# Patient Record
Sex: MALE | Race: WHITE | NOT HISPANIC OR LATINO | Employment: FULL TIME | URBAN - METROPOLITAN AREA
[De-identification: names, ages, dates, MRNs, and addresses within clinical notes are randomized per-mention and may not be internally consistent; named-entity substitution may affect disease eponyms.]

---

## 2017-01-16 ENCOUNTER — TELEPHONE (OUTPATIENT)
Dept: MEDICAL GROUP | Facility: CLINIC | Age: 62
End: 2017-01-16

## 2017-01-16 RX ORDER — GEMFIBROZIL 600 MG/1
TABLET, FILM COATED ORAL
Qty: 90 TAB | Refills: 3 | Status: SHIPPED | OUTPATIENT
Start: 2017-01-16 | End: 2017-06-19

## 2017-01-16 NOTE — TELEPHONE ENCOUNTER
pharmacy called and states patient is on Rouvastatin and recently received a RX for Gemifibrozil. Pharmacy states they should not be on both

## 2017-01-19 ENCOUNTER — TELEPHONE (OUTPATIENT)
Dept: MEDICAL GROUP | Facility: CLINIC | Age: 62
End: 2017-01-19

## 2017-01-19 DIAGNOSIS — R21 RASH: ICD-10-CM

## 2017-01-19 NOTE — TELEPHONE ENCOUNTER
Patient called and asked if you would be able to submit a RX for kealog creme and Prednisolone dose chuy.  He has a rash that's bothering him, He has an Appointment 2/3/17

## 2017-01-20 RX ORDER — TRIAMCINOLONE ACETONIDE 1 MG/G
1 CREAM TOPICAL 2 TIMES DAILY
Qty: 1 TUBE | Refills: 1 | Status: SHIPPED | OUTPATIENT
Start: 2017-01-20 | End: 2017-06-19

## 2017-01-20 RX ORDER — METHYLPREDNISOLONE 4 MG/1
TABLET ORAL
Qty: 21 TAB | Refills: 0 | Status: SHIPPED | OUTPATIENT
Start: 2017-01-20 | End: 2017-02-27

## 2017-02-03 ENCOUNTER — OFFICE VISIT (OUTPATIENT)
Dept: MEDICAL GROUP | Facility: MEDICAL CENTER | Age: 62
End: 2017-02-03
Payer: COMMERCIAL

## 2017-02-03 VITALS
DIASTOLIC BLOOD PRESSURE: 82 MMHG | OXYGEN SATURATION: 97 % | WEIGHT: 207 LBS | SYSTOLIC BLOOD PRESSURE: 142 MMHG | TEMPERATURE: 98 F | RESPIRATION RATE: 20 BRPM | HEART RATE: 78 BPM | HEIGHT: 72 IN | BODY MASS INDEX: 28.04 KG/M2

## 2017-02-03 DIAGNOSIS — E11.21 TYPE 2 DIABETES MELLITUS WITH DIABETIC NEPHROPATHY, WITHOUT LONG-TERM CURRENT USE OF INSULIN (HCC): ICD-10-CM

## 2017-02-03 DIAGNOSIS — R21 RASH: ICD-10-CM

## 2017-02-03 DIAGNOSIS — M06.9 RHEUMATOID ARTHRITIS INVOLVING BOTH KNEES, UNSPECIFIED RHEUMATOID FACTOR PRESENCE: ICD-10-CM

## 2017-02-03 PROCEDURE — 99215 OFFICE O/P EST HI 40 MIN: CPT | Performed by: FAMILY MEDICINE

## 2017-02-03 RX ORDER — PREDNISONE 5 MG/1
5 TABLET ORAL DAILY
Qty: 30 TAB | Refills: 0 | Status: SHIPPED | OUTPATIENT
Start: 2017-02-03 | End: 2017-06-19

## 2017-02-03 NOTE — PROGRESS NOTES
Chief Complaint   Patient presents with   • Rash     patient states he is here for a reccurring rash     Multiple medical problems    HISTORY OF PRESENT ILLNESS: Patient is a 61 y.o. male established patient who presents today for the following.      1. Type 2 diabetes mellitus with diabetic nephropathy, without long-term current use of insulin (CMS-HCC)  Reviewed labs with the patient. Diabetes is well controlled. Takes medications as prescribed. Discussed with the patient their renal complications with their diabetes.  Discussed that prolongation of this could lead to dialysis.  Discussed need to avoid nephrotoxic drugs to include NSAIDs, certain antibiotics and contrast dye. Patient understands. Patient denies any urinary problems. No blood in the urine noted.  Retinal exam up-to-date. No concerning sores on the feet. Gets regular foot exams.  Discussed medications along with risks of hypoglycemia and control of hypoglycemia.      2. Rash  This is a chronic and ongoing problem for the patient. He has had this since he was young. Medrol Dosepak got rid of the rash for him. The only change in medications has been increase in metformin. Do need to consider that as an option. Patient does not have any symptoms at present so we'll hold off and diabetes is well controlled.    3. Rheumatoid arthritis involving both knees, unspecified rheumatoid factor presence (CMS-HCC)  This is a chronic and stable problem for the patient. No recent outbreaks.      No problem-specific assessment & plan notes found for this encounter.      He  has a past medical history of Hypertension (2004); Rotator cuff disorder (1996); Hyperlipidemia (2011); Arthritis; Pain; and Diabetes.    Patient Active Problem List    Diagnosis Date Noted   • Eczema 03/07/2016   • Other male erectile dysfunction 03/07/2016   • Insomnia 03/07/2016   • Type 2 diabetes mellitus with renal manifestations (CMS-HCC) 08/07/2015   • Rash 08/07/2015   • Rheumatoid  arthritis, adult (CMS-[facility]) 08/07/2015   • Hyperlipidemia 03/22/2011   • Colon polyps 05/11/2010   • Hypertension    • Rotator cuff disorder        Allergies:Nkda and Other environmental    Current Outpatient Prescriptions   Medication Sig Dispense Refill   • predniSONE (DELTASONE) 5 MG Tab Take 1 Tab by mouth every day. 30 Tab 0   • triamcinolone acetonide (KENALOG) 0.1 % Cream Apply 1 Application to affected area(s) 2 times a day. 1 Tube 1   • gemfibrozil (LOPID) 600 MG Tab TAKE 1 TABLET BY ORAL ROUTE EVERY DAY 30 MINUTES BEFORE MORNING AND EVENING MEAL 90 Tab 3   • zolpidem (AMBIEN) 10 MG Tab Take 1 Tab by mouth at bedtime as needed for Sleep. 30 Tab 2   • amlodipine (NORVASC) 5 MG Tab TAKE 1 TABLET BY MOUTH TWICE A  Tab 0   • losartan (COZAAR) 100 MG Tab TAKE 1 TAB BY MOUTH EVERY DAY. 90 Tab 2   • losartan (COZAAR) 100 MG Tab TAKE 1 TAB BY MOUTH EVERY DAY. 90 Tab 2   • rosuvastatin (CRESTOR) 10 MG Tab TAKE 1 TABLET BY ORAL ROUTE EVERY DAY 30 Tab 0   • Blood Glucose Monitoring Suppl SUPPLIES Misc 1 Strip by Does not apply route 3 times a day. Test strips order: Test strips for One Touch Ultra 2 meter. Sig: use TID and prn ssx high or low sugar #100 RF x 3 100 Strip 3   • Lancets Misc 1 Each by Does not apply route 3 times a day as needed. Lancets order: Lancets for One Touch Ultra 2 meter. Sig: use TID and prn ssx high or low sugar. #100 RF x 3 100 Each 3   • clobetasol (TEMOVATE) 0.05 % Cream Apply 1 Application to affected area(s) 2 times a day. 1 Tube 0   • metformin ER modified (GLUMETZA) 1000 MG TABLET SR 24 HR Take 2 Tabs by mouth every day. 180 Tab 3   • tadalafil (CIALIS) 20 MG tablet Take 1 Tab by mouth as needed for Erectile Dysfunction. 6 Tab 3   • nabumetone (RELAFEN) 750 MG Tab Take 750 mg by mouth 2 times a day. Indications: Rheumatoid Arthritis     • hydroxychloroquine (PLAQUENIL) 200 MG Tab Take 200 mg by mouth every day. Indications: Rheumatoid Arthritis     • Omega-3 Fatty Acids (FISH OIL)  "1000 MG Cap capsule Take 1,000 mg by mouth 3 times a day, with meals.     • FOLIC ACID PO Take  by mouth.     • hydrocodone/acetaminophen (NORCO)  MG TABS Take 1-2 Tabs by mouth every 6 hours as needed.     • MethylPREDNISolone (MEDROL DOSEPAK) 4 MG Tablet Therapy Pack 6 tabs on day one, 5 tabs on day 2, 4 tabs on day 3, 3 tabs on day 4, 2 tabs on day 5, and 1 tab on day 6 21 Tab 0     No current facility-administered medications for this visit.       Social History   Substance Use Topics   • Smoking status: Current Every Day Smoker -- 0.50 packs/day for 30 years     Types: Cigarettes   • Smokeless tobacco: Never Used   • Alcohol Use: 2.4 oz/week     4 Standard drinks or equivalent per week      Comment: mod on occasion       Family History   Problem Relation Age of Onset   • Heart Disease Mother      MI, age 73   • Cancer Father      glioblasoma, age 82   • Stroke Mother    • Diabetes Paternal Grandfather    • Heart Attack Mother        Health Maintenance:      Review of Systems   No fever, chills, nausea, vomiting, diarrhea, chest pain or shortness of breath.  See HPI    Exam:  Blood pressure 142/82, pulse 78, temperature 36.7 °C (98 °F), resp. rate 20, height 1.829 m (6' 0.01\"), weight 93.895 kg (207 lb), SpO2 97 %. Body mass index is 28.07 kg/(m^2).  Constitutional:  NAD, well appearing.  HEENT:   NC/AT, PERRLA, EOMI, OP clear, no lymphadenopathy, no thyromegaly.    Cardiovascular: RRR.   No m/r/g. No carotid bruits.       Lungs:   CTAB, no w/r/r, no respiratory distress.  Abdomen: Soft, NT/ND + BS, no masses, no suprapubic tenderness, no hepatomegaly.  Extremities:  2+ DP and radial pulses bilaterally.  No c/c/e.  Skin:  Warm and dry.  Patient has healing areas of rash on his back and lower legs. No acute flares at present.diab  Neurologic: Alert & oriented x 3, CN II-XII grossly intact, strength and sensation grossly intact.  No focal deficits noted.  Psychiatric:  Affect normal, mood normal, judgment " normal.    Assessment/Plan:     1. Type 2 diabetes mellitus with diabetic nephropathy, without long-term current use of insulin (CMS-HCC)  Diabetes is well controlled. Discussed extensively renal manifestations and possible progression to dialysis without better control.  Continue hypoglycemic medications.  Continue ACEI/ARB  and statin.  Labs as indicated. Continue to monitor    - CBC WITHOUT DIFFERENTIAL; Future  - COMP METABOLIC PANEL; Future  - HEMOGLOBIN A1C; Future  - MICROALBUMIN CREAT RATIO URINE; Future  - LIPID PROFILE; Future    2. Rash  Patient did well with Medrol Dosepak. He is given a use prednisone intermittently as needed. Doing well at present.  - predniSONE (DELTASONE) 5 MG Tab; Take 1 Tab by mouth every day.  Dispense: 30 Tab; Refill: 0    3. Rheumatoid arthritis involving both knees, unspecified rheumatoid factor presence (CMS-HCC)  Chronic and stable problem. We'll follow along with the patient's rheumatologist. Continue DMARD.    I spent greater than 40 minutes face-to-face with the patient of which greater than 50% of the time was spent in coordination of care and counseling. This included extensive chart review to review the patient's medications and recent changes. Attempts to locate dermatological and pathology records and reviewed.          Followup: No Follow-up on file.    Please note that this dictation was created using voice recognition software. I have made every reasonable attempt to correct obvious errors, but I expect that there are errors of grammar and possibly content that I did not discover before finalizing the note.

## 2017-02-03 NOTE — Clinical Note
[facility]  Anthony Olson M.D.  [address]  [place] [address]  Fax: [phone] Authorization for Release/Disclosure of Protected Health Information   Name: ARIES COHEN : 1955 SSN: [id number]   Address: [address] [place] [address]  [place] [address] Phone:    [phone] (home)    I authorize the entity listed below to release/disclose the PHI below to [facility]/Anthony Olson M.D.   Provider or Entity Name:  [facility]     Address   City, State, [facility]   Phone:      Fax:     Reason for request: continuity of care   Information to be released:    [  ] LAST COLONOSCOPY, including any PATH REPORT [  ] LAST DEXA  [  ] LAST MAMMOGRAM  [  ] LAST PAP [ x ] RETINA EXAM REPORT  [  ] IMMUNIZATION RECORDS  [  ] Release all info      [  ] Check here and initial the line next to each item to release ALL health information INCLUDING  _____ Care and treatment for drug and / or alcohol abuse  _____ HIV testing, infection status, or AIDS  _____ Genetic Testing    DATES OF SERVICE OR TIME PERIOD TO BE DISCLOSED: ___most recent__________  I understand and acknowledge that:  * This Authorization may be revoked at any time by you in writing, except if your health information has already been used or disclosed.  * Your health information that will be used or disclosed as a result of you signing this authorization could be re-disclosed by the recipient. If this occurs, your re-disclosed health information may no longer be protected by State or Federal laws.  * You may refuse to sign this Authorization. Your refusal will not affect your ability to obtain treatment.  * This Authorization becomes effective upon signing and will  on (date) __________. If no date is indicated, this Authorization will  one (1) year from the signature date.    Name: Aries Cohen    Signature:     Date: 2/3/2017

## 2017-02-03 NOTE — MR AVS SNAPSHOT
"        Aries Cohen   2/3/2017 8:00 AM   Office Visit   MRN: [id number]    Department:  [facility]   Dept Phone:  [phone]    Description:  Male : 1955   Provider:  Anthony Olson M.D.           Reason for Visit     Rash patient states he is here for a reccurring rash      Allergies as of 2/3/2017     Allergen Noted Reactions    Nkda [No Known Drug Allergy] 2011       Other Environmental 2011         You were diagnosed with     Type 2 diabetes mellitus with diabetic nephropathy, without long-term current use of insulin (CMS-HCC)   [8044077]       Rash   [226221]         Vital Signs     Blood Pressure Pulse Temperature Respirations Height Weight    142/82 mmHg 78 36.7 °C (98 °F) 20 1.829 m (6' 0.01\") 93.895 kg (207 lb)    Body Mass Index Oxygen Saturation Smoking Status             28.07 kg/m2 97% Current Every Day Smoker         Basic Information     Date Of Birth Sex Race Ethnicity Preferred Language    1955 Male White Non- English      Your appointments     2017  8:20 AM   Established Patient with Anthony Olson M.D.   [facility] (--)    [address]  [address]  [facility] [address] [phone]           You will be receiving a confirmation call a few days before your appointment from our automated call confirmation system.              Problem List              ICD-10-CM Priority Class Noted - Resolved    Hypertension I10   Unknown - Present    Rotator cuff disorder M67.919   Unknown - Present    Colon polyps K63.5   2010 - Present    Hyperlipidemia E78.5   3/22/2011 - Present    Type 2 diabetes mellitus with renal manifestations (CMS-HCC) E11.29   2015 - Present    Rash R21   2015 - Present    Rheumatoid arthritis, adult (CMS-HCC) M06.9   2015 - Present    Eczema L30.9   3/7/2016 - Present    Other male erectile dysfunction N52.8   3/7/2016 - Present    Insomnia G47.00   3/7/2016 - Present      Health " Maintenance        Date Due Completion Dates    IMM PNEUMOCOCCAL 19-64 (ADULT) MEDIUM RISK SERIES (1 of 1 - PPSV23) 5/21/1973 ---    RETINAL SCREENING 4/20/2016 4/20/2015 (Done)    Override on 4/20/2015: Done    DIABETES MONOFILAMENT / LE EXAM 3/7/2017 3/7/2016    A1C SCREENING 3/20/2017 9/20/2016, 3/7/2016, 8/25/2015, 8/10/2013, 5/4/2013, 10/23/2012, 7/14/2012, 1/7/2012, 8/2/2011, 3/30/2011    FASTING LIPID PROFILE 9/20/2017 9/20/2016, 8/25/2015, 5/4/2013, 1/5/2013, 10/23/2012, 7/14/2012, 5/2/2012, 1/7/2012, 9/30/2011, 8/2/2011, 3/21/2011    URINE ACR / MICROALBUMIN 9/20/2017 9/20/2016, 8/25/2015, 8/2/2011    SERUM CREATININE 9/20/2017 9/20/2016, 8/25/2015, 11/30/2013, 8/10/2013, 7/16/2013, 5/4/2013, 1/5/2013, 1/5/2013, 10/23/2012, 5/2/2012, 3/12/2012, 1/7/2012, 8/26/2011, 3/21/2011    IMM DTaP/Tdap/Td Vaccine (2 - Td) 12/10/2023 12/10/2013    COLONOSCOPY 8/8/2024 8/8/2014            Current Immunizations     Influenza Vaccine Quad Inj (Preserved) 10/21/2016, 10/7/2015    SHINGLES VACCINE 10/7/2015    Tdap Vaccine 12/10/2013  7:26 AM      Below and/or attached are the medications your provider expects you to take. Review all of your home medications and newly ordered medications with your provider and/or pharmacist. Follow medication instructions as directed by your provider and/or pharmacist. Please keep your medication list with you and share with your provider. Update the information when medications are discontinued, doses are changed, or new medications (including over-the-counter products) are added; and carry medication information at all times in the event of emergency situations     Allergies:  NKDA - (reactions not documented)     OTHER ENVIRONMENTAL - (reactions not documented)               Medications  Valid as of: February 03, 2017 -  8:38 AM    Generic Name Brand Name Tablet Size Instructions for use    AmLODIPine Besylate (Tab) NORVASC 5 MG TAKE 1 TABLET BY MOUTH TWICE A DAY        Blood Glucose  Monitoring Suppl (Misc) Blood Glucose Monitoring Suppl SUPPLIES 1 Strip by Does not apply route 3 times a day. Test strips order: Test strips for One Touch Ultra 2 meter. Sig: use TID and prn ssx high or low sugar #100 RF x 3        Clobetasol Propionate (Cream) TEMOVATE 0.05 % Apply 1 Application to affected area(s) 2 times a day.        Folic Acid   Take  by mouth.        Gemfibrozil (Tab) LOPID 600 MG TAKE 1 TABLET BY ORAL ROUTE EVERY DAY 30 MINUTES BEFORE MORNING AND EVENING MEAL        Hydrocodone-Acetaminophen (Tab) NORCO  MG Take 1-2 Tabs by mouth every 6 hours as needed.        Hydroxychloroquine Sulfate (Tab) PLAQUENIL 200 MG Take 200 mg by mouth every day. Indications: Rheumatoid Arthritis        Lancets (Misc) Lancets  1 Each by Does not apply route 3 times a day as needed. Lancets order: Lancets for One Touch Ultra 2 meter. Sig: use TID and prn ssx high or low sugar. #100 RF x 3        Losartan Potassium (Tab) COZAAR 100 MG TAKE 1 TAB BY MOUTH EVERY DAY.        Losartan Potassium (Tab) COZAAR 100 MG TAKE 1 TAB BY MOUTH EVERY DAY.        MetFORMIN HCl (TABLET SR 24 HR) GLUMETZA 1000 MG Take 2 Tabs by mouth every day.        MethylPREDNISolone (Tablet Therapy Pack) MEDROL DOSEPAK 4 MG 6 tabs on day one, 5 tabs on day 2, 4 tabs on day 3, 3 tabs on day 4, 2 tabs on day 5, and 1 tab on day 6        Nabumetone (Tab) RELAFEN 750 MG Take 750 mg by mouth 2 times a day. Indications: Rheumatoid Arthritis        Omega-3 Fatty Acids (Cap) fish oil 1000 MG Take 1,000 mg by mouth 3 times a day, with meals.        PredniSONE (Tab) DELTASONE 5 MG Take 1 Tab by mouth every day.        Rosuvastatin Calcium (Tab) CRESTOR 10 MG TAKE 1 TABLET BY ORAL ROUTE EVERY DAY        Tadalafil (Tab) CIALIS 20 MG Take 1 Tab by mouth as needed for Erectile Dysfunction.        Triamcinolone Acetonide (Cream) KENALOG 0.1 % Apply 1 Application to affected area(s) 2 times a day.        Zolpidem Tartrate (Tab) AMBIEN 10 MG Take 1 Tab by  mouth at bedtime as needed for Sleep.        .                 Medicines prescribed today were sent to:     [facility] #[id number] - [place] - [address]    [address] [address]    Phone: [phone] Fax: [phone]    Open 24 Hours?: No      Medication refill instructions:       If your prescription bottle indicates you have medication refills left, it is not necessary to call your provider’s office. Please contact your pharmacy and they will refill your medication.    If your prescription bottle indicates you do not have any refills left, you may request refills at any time through one of the following ways: The online [employer] system (except Urgent Care), by calling your provider’s office, or by asking your pharmacy to contact your provider’s office with a refill request. Medication refills are processed only during regular business hours and may not be available until the next business day. Your provider may request additional information or to have a follow-up visit with you prior to refilling your medication.   *Please Note: Medication refills are assigned a new Rx number when refilled electronically. Your pharmacy may indicate that no refills were authorized even though a new prescription for the same medication is available at the pharmacy. Please request the medicine by name with the pharmacy before contacting your provider for a refill.        Your To Do List     Future Labs/Procedures Complete By Expires    CBC WITHOUT DIFFERENTIAL  As directed 8/6/2017    COMP METABOLIC PANEL  As directed 2/4/2018    HEMOGLOBIN A1C  As directed 2/4/2018    LIPID PROFILE  As directed 2/4/2018    MICROALBUMIN CREAT RATIO URINE  As directed 2/4/2018         [employer] Status: Patient Declined

## 2017-02-03 NOTE — Clinical Note
[facility]  Anthony Olson M.D.  [address]  [place] [address]  Fax: [phone] Authorization for Release/Disclosure of Protected Health Information   Name: ARIES COHEN : 1955 SSN: [id number]   Address: [address] [place] [address]  [place] [address] Phone:    [phone] (home)    I authorize the entity listed below to release/disclose the PHI below to [facility]/Anthony Olson M.D.   Provider or Entity Name:  [facility]     Address   City, State, [facility]   Phone:      Fax:     Reason for request: continuity of care   Information to be released:    [  ] LAST COLONOSCOPY, including any PATH REPORT [  ] LAST DEXA  [  ] LAST MAMMOGRAM  [  ] LAST PAP [  ] RETINA EXAM REPORT  [  ] IMMUNIZATION RECORDS  [x  ] Release all info      [  ] Check here and initial the line next to each item to release ALL health information INCLUDING  _____ Care and treatment for drug and / or alcohol abuse  _____ HIV testing, infection status, or AIDS  _____ Genetic Testing    DATES OF SERVICE OR TIME PERIOD TO BE DISCLOSED: _Most recent____________  I understand and acknowledge that:  * This Authorization may be revoked at any time by you in writing, except if your health information has already been used or disclosed.  * Your health information that will be used or disclosed as a result of you signing this authorization could be re-disclosed by the recipient. If this occurs, your re-disclosed health information may no longer be protected by State or Federal laws.  * You may refuse to sign this Authorization. Your refusal will not affect your ability to obtain treatment.  * This Authorization becomes effective upon signing and will  on (date) __________. If no date is indicated, this Authorization will  one (1) year from the signature date.    Name: Aries Cohen    Signature:     Date: 2/3/2017

## 2017-02-14 ENCOUNTER — TELEPHONE (OUTPATIENT)
Dept: MEDICAL GROUP | Facility: MEDICAL CENTER | Age: 62
End: 2017-02-14

## 2017-02-14 NOTE — TELEPHONE ENCOUNTER
Patient states he has been taking Gemfibrozil 600 mg BID. Patient states should he be taking 1 or 2 daily. Please advise

## 2017-02-24 ENCOUNTER — OFFICE VISIT (OUTPATIENT)
Dept: URGENT CARE | Facility: CLINIC | Age: 62
End: 2017-02-24
Payer: COMMERCIAL

## 2017-02-24 VITALS
OXYGEN SATURATION: 94 % | HEART RATE: 96 BPM | RESPIRATION RATE: 18 BRPM | DIASTOLIC BLOOD PRESSURE: 90 MMHG | TEMPERATURE: 98 F | HEIGHT: 72 IN | WEIGHT: 212.4 LBS | SYSTOLIC BLOOD PRESSURE: 140 MMHG | BODY MASS INDEX: 28.77 KG/M2

## 2017-02-24 DIAGNOSIS — T07.XXXA MULTIPLE EXCORIATIONS: ICD-10-CM

## 2017-02-24 DIAGNOSIS — L30.9 DERMATITIS: ICD-10-CM

## 2017-02-24 PROCEDURE — 99213 OFFICE O/P EST LOW 20 MIN: CPT | Performed by: NURSE PRACTITIONER

## 2017-02-24 RX ORDER — CEPHALEXIN 500 MG/1
500 CAPSULE ORAL 3 TIMES DAILY
Qty: 30 CAP | Refills: 0 | Status: SHIPPED | OUTPATIENT
Start: 2017-02-24 | End: 2017-03-06

## 2017-02-24 ASSESSMENT — ENCOUNTER SYMPTOMS
MYALGIAS: 0
CHILLS: 0
FEVER: 0

## 2017-02-24 NOTE — PROGRESS NOTES
Subjective:      Aries Cohen is a 61 y.o. male who presents with Rash    Past Medical History   Diagnosis Date   • Hypertension 2004   • Rotator cuff disorder 1996     R shoulder   • Hyperlipidemia 2011     elevated triglycerides   • Arthritis    • Pain      feet   • Diabetes      Social History     Social History   • Marital Status: Single     Spouse Name: N/A   • Number of Children: N/A   • Years of Education: N/A     Occupational History   • Not on file.     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 Rashes been dry and scaly in nature. He has seen both his primary physician and also his dermatologist as well as his rheumatologist for this problem. He is undergone various treatments including withdrawal of certain medications, topical steroids, and skin biopsy. Patient states that the skin biopsy was negative and so far no treatments have been completely effective. He states this rash has worsened over the last 2 weeks since he has had increased stress. States his wife is in the hospital and he is having also some family challenges as well. Patient has an appointment to follow up with his primary physician on Monday. He is here today primarily because he is concerned that due to itching in his sleep he has had  multiple excoriations to the upper outer thighs and he is concerned that they're getting infected. He is requesting an antibiotic today.          Rash  This is a chronic problem. The current episode started more than 1 month ago. The problem has been gradually worsening since onset. The rash is characterized by dryness, itchiness, pain, redness and scaling. It is unknown if there was an exposure to a precipitant. Pertinent negatives include no fever. Past treatments include nothing. The treatment provided no relief.       Review of Systems   Constitutional: Negative for fever, chills and malaise/fatigue.   Musculoskeletal: Negative for myalgias.   Skin: Positive for itching and rash.       All other systems reviewed and are negative      Objective:     /90 mmHg  Pulse 96  Temp(Src) 36.7 °C (98 °F)  Resp 18  Ht 1.829 m (6')  Wt 96.344 kg (212 lb 6.4 oz)  BMI 28.80 kg/m2  SpO2 94%     Physical Exam   Constitutional: He is oriented to person, place, and time. He appears well-developed and well-nourished. No distress.   Neurological: He is alert and oriented to person, place, and time.   Skin: Skin is warm and dry. Rash noted. He is not diaphoretic.   There are multiple areas of dry scaly skin noted on the upper arms abdomen and back. There are several patches on the lateral aspect of both thighs, the nurse excoriated and moderately red as well. No drainage or streaking noted.   Vitals reviewed.              Assessment/Plan:   Generalized dermatitis  Multiple excoriations  Skin infection  -Keep appointment with primary doctor on Monday  -Keflex 3 times a day for 10 days  -Follow up otherwise in urgent care as needed  There are no diagnoses linked to this encounter.

## 2017-02-24 NOTE — MR AVS SNAPSHOT
Aries Cohen   2017 10:00 AM   Office Visit   MRN: [id number]    Department:  [facility]   Dept Phone:  [phone]    Description:  Male : 1955   Provider:  Cathey J Hamman, A.P.N.           Reason for Visit     Rash x for months has a rash on legs and back, saw Derm and is still spreading, some spots on legs might be infected, has appt with PCP monday but wants antibiotics       Allergies as of 2017     Allergen Noted Reactions    Nkda [No Known Drug Allergy] 2011       Other Environmental 2011         You were diagnosed with     Multiple excoriations   [593852]       Dermatitis   [210615]         Vital Signs     Blood Pressure Pulse Temperature Respirations Height Weight    140/90 mmHg 96 36.7 °C (98 °F) 18 1.829 m (6') 96.344 kg (212 lb 6.4 oz)    Body Mass Index Oxygen Saturation Smoking Status             28.80 kg/m2 94% Current Every Day Smoker         Basic Information     Date Of Birth Sex Race Ethnicity Preferred Language    1955 Male White Non- English      Your appointments     2017 11:20 AM   Established Patient with Anthony Olson M.D.   [facility] (--)    [address]  [address]  [place] [place] [facility] [id number] [phone]           You will be receiving a confirmation call a few days before your appointment from our automated call confirmation system.            2017  8:20 AM   Established Patient with Anthony Olson M.D.   [facility] (--)    [address]  [address]  [place] [id number] [phone]           You will be receiving a confirmation call a few days before your appointment from our automated call confirmation system.              Problem List              ICD-10-CM Priority Class Noted - Resolved    Hypertension I10   Unknown - Present    Rotator cuff disorder M67.919   Unknown - Present    Colon polyps K63.5   2010 - Present    Hyperlipidemia E78.5    3/22/2011 - Present    Type 2 diabetes mellitus with renal manifestations (CMS-HCC) E11.29   8/7/2015 - Present    Rash R21   8/7/2015 - Present    Rheumatoid arthritis, adult (CMS-HCC) M06.9   8/7/2015 - Present    Eczema L30.9   3/7/2016 - Present    Other male erectile dysfunction N52.8   3/7/2016 - Present    Insomnia G47.00   3/7/2016 - Present      Health Maintenance        Date Due Completion Dates    IMM PNEUMOCOCCAL 19-64 (ADULT) MEDIUM RISK SERIES (1 of 1 - PPSV23) 5/21/1973 ---    RETINAL SCREENING 4/20/2016 4/20/2015 (Done)    Override on 4/20/2015: Done    DIABETES MONOFILAMENT / LE EXAM 3/7/2017 3/7/2016    A1C SCREENING 3/20/2017 9/20/2016, 3/7/2016, 8/25/2015, 8/10/2013, 5/4/2013, 10/23/2012, 7/14/2012, 1/7/2012, 8/2/2011, 3/30/2011    FASTING LIPID PROFILE 9/20/2017 9/20/2016, 8/25/2015, 5/4/2013, 1/5/2013, 10/23/2012, 7/14/2012, 5/2/2012, 1/7/2012, 9/30/2011, 8/2/2011, 3/21/2011    URINE ACR / MICROALBUMIN 9/20/2017 9/20/2016, 8/25/2015, 8/2/2011    SERUM CREATININE 9/20/2017 9/20/2016, 8/25/2015, 11/30/2013, 8/10/2013, 7/16/2013, 5/4/2013, 1/5/2013, 1/5/2013, 10/23/2012, 5/2/2012, 3/12/2012, 1/7/2012, 8/26/2011, 3/21/2011    IMM DTaP/Tdap/Td Vaccine (2 - Td) 12/10/2023 12/10/2013    COLONOSCOPY 8/8/2024 8/8/2014            Current Immunizations     Influenza Vaccine Quad Inj (Preserved) 10/21/2016, 10/7/2015    SHINGLES VACCINE 10/7/2015    Tdap Vaccine 12/10/2013  7:26 AM      Below and/or attached are the medications your provider expects you to take. Review all of your home medications and newly ordered medications with your provider and/or pharmacist. Follow medication instructions as directed by your provider and/or pharmacist. Please keep your medication list with you and share with your provider. Update the information when medications are discontinued, doses are changed, or new medications (including over-the-counter products) are added; and carry medication information at all times in  the event of emergency situations     Allergies:  NKDA - (reactions not documented)     OTHER ENVIRONMENTAL - (reactions not documented)               Medications  Valid as of: February 24, 2017 - 10:32 AM    Generic Name Brand Name Tablet Size Instructions for use    AmLODIPine Besylate (Tab) NORVASC 5 MG TAKE 1 TABLET BY MOUTH TWICE A DAY        Blood Glucose Monitoring Suppl (Misc) Blood Glucose Monitoring Suppl SUPPLIES 1 Strip by Does not apply route 3 times a day. Test strips order: Test strips for One Touch Ultra 2 meter. Sig: use TID and prn ssx high or low sugar #100 RF x 3        Cephalexin (Cap) KEFLEX 500 MG Take 1 Cap by mouth 3 times a day for 10 days.        Clobetasol Propionate (Cream) TEMOVATE 0.05 % Apply 1 Application to affected area(s) 2 times a day.        Folic Acid   Take  by mouth.        Gemfibrozil (Tab) LOPID 600 MG TAKE 1 TABLET BY ORAL ROUTE EVERY DAY 30 MINUTES BEFORE MORNING AND EVENING MEAL        Hydrocodone-Acetaminophen (Tab) NORCO  MG Take 1-2 Tabs by mouth every 6 hours as needed.        Hydroxychloroquine Sulfate (Tab) PLAQUENIL 200 MG Take 200 mg by mouth every day. Indications: Rheumatoid Arthritis        Lancets (Misc) Lancets  1 Each by Does not apply route 3 times a day as needed. Lancets order: Lancets for One Touch Ultra 2 meter. Sig: use TID and prn ssx high or low sugar. #100 RF x 3        Losartan Potassium (Tab) COZAAR 100 MG TAKE 1 TAB BY MOUTH EVERY DAY.        Losartan Potassium (Tab) COZAAR 100 MG TAKE 1 TAB BY MOUTH EVERY DAY.        MetFORMIN HCl (TABLET SR 24 HR) GLUMETZA 1000 MG Take 2 Tabs by mouth every day.        MethylPREDNISolone (Tablet Therapy Pack) MEDROL DOSEPAK 4 MG 6 tabs on day one, 5 tabs on day 2, 4 tabs on day 3, 3 tabs on day 4, 2 tabs on day 5, and 1 tab on day 6        Nabumetone (Tab) RELAFEN 750 MG Take 750 mg by mouth 2 times a day. Indications: Rheumatoid Arthritis        Omega-3 Fatty Acids (Cap) fish oil 1000 MG Take 1,000 mg  by mouth 3 times a day, with meals.        PredniSONE (Tab) DELTASONE 5 MG Take 1 Tab by mouth every day.        Rosuvastatin Calcium (Tab) CRESTOR 10 MG TAKE 1 TABLET BY ORAL ROUTE EVERY DAY        Tadalafil (Tab) CIALIS 20 MG Take 1 Tab by mouth as needed for Erectile Dysfunction.        Triamcinolone Acetonide (Cream) KENALOG 0.1 % Apply 1 Application to affected area(s) 2 times a day.        Zolpidem Tartrate (Tab) AMBIEN 10 MG Take 1 Tab by mouth at bedtime as needed for Sleep.        .                 Medicines prescribed today were sent to:     [id number] - [place] - [address] [place] [address]    Phone: [phone] Fax: [phone]    Open 24 Hours?: No      Medication refill instructions:       If your prescription bottle indicates you have medication refills left, it is not necessary to call your provider’s office. Please contact your pharmacy and they will refill your medication.    If your prescription bottle indicates you do not have any refills left, you may request refills at any time through one of the following ways: The online shopp system (except Urgent Care), by calling your provider’s office, or by asking your pharmacy to contact your provider’s office with a refill request. Medication refills are processed only during regular business hours and may not be available until the next business day. Your provider may request additional information or to have a follow-up visit with you prior to refilling your medication.   *Please Note: Medication refills are assigned a new Rx number when refilled electronically. Your pharmacy may indicate that no refills were authorized even though a new prescription for the same medication is available at the pharmacy. Please request the medicine by name with the pharmacy before contacting your provider for a refill.           MyChart Status: Patient Declined

## 2017-02-27 ENCOUNTER — OFFICE VISIT (OUTPATIENT)
Dept: MEDICAL GROUP | Facility: MEDICAL CENTER | Age: 62
End: 2017-02-27
Payer: COMMERCIAL

## 2017-02-27 VITALS
BODY MASS INDEX: 28.58 KG/M2 | HEART RATE: 84 BPM | HEIGHT: 72 IN | WEIGHT: 211 LBS | TEMPERATURE: 98 F | SYSTOLIC BLOOD PRESSURE: 160 MMHG | RESPIRATION RATE: 20 BRPM | OXYGEN SATURATION: 97 % | DIASTOLIC BLOOD PRESSURE: 90 MMHG

## 2017-02-27 DIAGNOSIS — R21 RASH: ICD-10-CM

## 2017-02-27 PROCEDURE — 99215 OFFICE O/P EST HI 40 MIN: CPT | Performed by: FAMILY MEDICINE

## 2017-02-27 RX ORDER — ALPRAZOLAM 0.5 MG/1
0.5 TABLET ORAL NIGHTLY PRN
Qty: 60 TAB | Refills: 1 | Status: SHIPPED | OUTPATIENT
Start: 2017-02-27 | End: 2017-06-19

## 2017-02-27 NOTE — Clinical Note
February 27, 2017     Aries Cohen  [address]  [place] [address]      Dear Aries:    Thank you for enrolling in [employer]. Please follow the instructions below to securely access your online medical record. [employer]access allows you to send messages to your healthcare team, view certain test results, renew your prescriptions, schedule appointments, and more.     How Do I Sign Up?  1. In your Internet browser, go to  [email or web address]  2. Click on the Sign Up Now link in the Sign In box. You will see the New Member Sign Up page.  3. Enter your Longaccess Access Code exactly as it appears below (case sensitive). You will not need to use this code after you’ve completed the sign-up process. If you do not sign up before the expiration date, you must request a new code.  [employer]access Access Code: [email or web address]  Expires: 3/29/2017 12:19 PM    4. Enter your Email address and Date of Birth (mm/dd/yyyy) as indicated and click Submit. You will be taken to the next sign-up page.  5. Create a Longaccess ID (case sensitive) . This will be your Longaccess login ID and cannot be changed, so think of one that is secure and easy to remember.  6. Create a Longaccess password  (case sensitive).   · Your password must be a length of at least 6 characters/digits.  · It must include at least 1 numeric.  · You can change your password at any time.  7. Enter your Password Reset Question and Answer. This can be used at a later time if you forget your password.   8. Enter your e-mail address. You will receive e-mail notification when new information is available in [employer]access.  9. Click Sign Up. You can now view your medical record.     Additional Information  Please contact [employer]access Customer Support at [phone] for any questions . Remember, Longaccess is NOT to be used for urgent needs. For medical emergencies, dial 911.          Introducing Longaccess    [facility]’s Secure, Online Health Connection      [facility] now offers  convenient, online access to your healthcare team and personal health information.  [employer] makes managing your healthcare easier than ever, and allows you to:  • Email your healthcare provider securely and privately  • Access your test results  • Request prescription refills 24 hours a day  • View your personal medical records from home  • Schedule or change your appointments  • View your Insurance and Billing Information  • Pay bills online ? Coming soon!        Sign below to get started:  I hereby request access to [employer] application.  I agree to abide by the [employer] Terms and Conditions, which will be provided to me upon activating my account.       __________________________________        _________________________  Name (Please Print)          Date of Birth     __________________________________       _________________________  Signature          Primary Care Provider      _______________  Date                          *For Internal Use Only: Please scan this form into the patient’s chart. Click on  - Select Patient - Attach to Encounter:  - Document Type: Consent   - Document Description: MyChart Consent

## 2017-02-27 NOTE — PROGRESS NOTES
Chief Complaint   Patient presents with   • Follow-Up     patient is here for a update on health     Multiple medical problems    HISTORY OF PRESENT ILLNESS: Patient is a 61 y.o. male established patient who presents today for the following.      1. Rash  Patient has a persistent worsening rash. His rheumatologist thinks it could be a drug rash. We reviewed the patient's medications. We have stopped multiple medications. We discussed his pruritus. He is going to start Zantac and Zyrtec. He is having an abundance of difficulty sleeping and we discussed risks benefits alternatives to the use of Xanax.      No problem-specific assessment & plan notes found for this encounter.      He  has a past medical history of Hypertension (2004); Rotator cuff disorder (1996); Hyperlipidemia (2011); Arthritis; Pain; and Diabetes.    Patient Active Problem List    Diagnosis Date Noted   • Eczema 03/07/2016   • Other male erectile dysfunction 03/07/2016   • Insomnia 03/07/2016   • Type 2 diabetes mellitus with renal manifestations (CMS-HCC) 08/07/2015   • Rash 08/07/2015   • Rheumatoid arthritis, adult (CMS-HCC) 08/07/2015   • Hyperlipidemia 03/22/2011   • Colon polyps 05/11/2010   • Hypertension    • Rotator cuff disorder        Allergies:Nkda and Other environmental    Current Outpatient Prescriptions   Medication Sig Dispense Refill   • alprazolam (XANAX) 0.5 MG Tab Take 1 Tab by mouth at bedtime as needed for Sleep. 60 Tab 1   • cephALEXin (KEFLEX) 500 MG Cap Take 1 Cap by mouth 3 times a day for 10 days. 30 Cap 0   • triamcinolone acetonide (KENALOG) 0.1 % Cream Apply 1 Application to affected area(s) 2 times a day. 1 Tube 1   • gemfibrozil (LOPID) 600 MG Tab TAKE 1 TABLET BY ORAL ROUTE EVERY DAY 30 MINUTES BEFORE MORNING AND EVENING MEAL 90 Tab 3   • zolpidem (AMBIEN) 10 MG Tab Take 1 Tab by mouth at bedtime as needed for Sleep. 30 Tab 2   • amlodipine (NORVASC) 5 MG Tab TAKE 1 TABLET BY MOUTH TWICE A  Tab 0   • losartan  (COZAAR) 100 MG Tab TAKE 1 TAB BY MOUTH EVERY DAY. 90 Tab 2   • losartan (COZAAR) 100 MG Tab TAKE 1 TAB BY MOUTH EVERY DAY. 90 Tab 2   • rosuvastatin (CRESTOR) 10 MG Tab TAKE 1 TABLET BY ORAL ROUTE EVERY DAY 30 Tab 0   • Blood Glucose Monitoring Suppl SUPPLIES Misc 1 Strip by Does not apply route 3 times a day. Test strips order: Test strips for One Touch Ultra 2 meter. Sig: use TID and prn ssx high or low sugar #100 RF x 3 100 Strip 3   • Lancets Misc 1 Each by Does not apply route 3 times a day as needed. Lancets order: Lancets for One Touch Ultra 2 meter. Sig: use TID and prn ssx high or low sugar. #100 RF x 3 100 Each 3   • clobetasol (TEMOVATE) 0.05 % Cream Apply 1 Application to affected area(s) 2 times a day. 1 Tube 0   • metformin ER modified (GLUMETZA) 1000 MG TABLET SR 24 HR Take 2 Tabs by mouth every day. 180 Tab 3   • tadalafil (CIALIS) 20 MG tablet Take 1 Tab by mouth as needed for Erectile Dysfunction. 6 Tab 3   • nabumetone (RELAFEN) 750 MG Tab Take 750 mg by mouth 2 times a day. Indications: Rheumatoid Arthritis     • hydroxychloroquine (PLAQUENIL) 200 MG Tab Take 200 mg by mouth every day. Indications: Rheumatoid Arthritis     • Omega-3 Fatty Acids (FISH OIL) 1000 MG Cap capsule Take 1,000 mg by mouth 3 times a day, with meals.     • FOLIC ACID PO Take  by mouth.     • hydrocodone/acetaminophen (NORCO)  MG TABS Take 1-2 Tabs by mouth every 6 hours as needed.     • predniSONE (DELTASONE) 5 MG Tab Take 1 Tab by mouth every day. 30 Tab 0     No current facility-administered medications for this visit.       Social History   Substance Use Topics   • Smoking status: Current Every Day Smoker -- 0.50 packs/day for 30 years     Types: Cigarettes   • Smokeless tobacco: Never Used   • Alcohol Use: 2.4 oz/week     4 Standard drinks or equivalent per week      Comment: mod on occasion       Family History   Problem Relation Age of Onset   • Heart Disease Mother      MI, age 73   • Cancer Father       glioblasoma, age 82   • Stroke Mother    • Diabetes Paternal Grandfather    • Heart Attack Mother        Health Maintenance:      Review of Systems   No fever, chills, nausea, vomiting, diarrhea, chest pain or shortness of breath.  See HPI    Exam:  Blood pressure 160/90, pulse 84, temperature 36.7 °C (98 °F), resp. rate 20, height 1.829 m (6'), weight 95.709 kg (211 lb), SpO2 97 %. Body mass index is 28.61 kg/(m^2).  Constitutional:  NAD, well appearing.  HEENT:   NC/AT, PERRLA, EOMI, OP clear, no lymphadenopathy, no thyromegaly.    Cardiovascular: RRR.   No m/r/g. No carotid bruits.       Lungs:   CTAB, no w/r/r, no respiratory distress.  Abdomen: Soft, NT/ND + BS, no masses, no suprapubic tenderness, no hepatomegaly.  Extremities:  2+ DP and radial pulses bilaterally.  No c/c/e.  Skin:  Multiple isolated lesions on the patient's bilateral thighs. A diffuse macular papular rash on the patient's back with some on the left flank and right upper arm.   Neurologic: Alert & oriented x 3, CN II-XII grossly intact, strength and sensation grossly intact.  No focal deficits noted.  Psychiatric:  Affect normal, mood normal, judgment normal.    Assessment/Plan:     1. Rash  Have asked the patient to start Zyrtec and Zantac. Prescription for Xanax written. Will stop the patient's gemfibrozil and statin medication. Have had him cut his metformin in half. Some timing with increasing the metformin to when this patient had the rash. An extensive amount of education was discussed.  - REFERRAL TO DERMATOLOGY  - REFERRAL TO ALLERGY      I spent greater than 40 minutes face-to-face with the patient of which greater than 50% of the time was spent in coordination of care and counseling.             Followup: No Follow-up on file.    Please note that this dictation was created using voice recognition software. I have made every reasonable attempt to correct obvious errors, but I expect that there are errors of grammar and possibly  content that I did not discover before finalizing the note.

## 2017-02-27 NOTE — MR AVS SNAPSHOT
Aries Cohen   2017 11:20 AM   Office Visit   MRN: [id number]    Department:  [facility]   Dept Phone:  [phone]    Description:  Male : 1955   Provider:  Anthony Olson M.D.           Reason for Visit     Follow-Up patient is here for a update on health      Allergies as of 2017     Allergen Noted Reactions    Nkda [No Known Drug Allergy] 2011       Other Environmental 2011         You were diagnosed with     Rash   [[id number]]         Vital Signs     Blood Pressure Pulse Temperature Respirations Height Weight    160/90 mmHg 84 36.7 °C (98 °F) 20 1.829 m (6') 95.709 kg (211 lb)    Body Mass Index Oxygen Saturation Smoking Status             28.61 kg/m2 97% Current Every Day Smoker         Basic Information     Date Of Birth Sex Race Ethnicity Preferred Language    1955 Male White Non- English      Your appointments     2017  9:20 AM   Established Patient with Anthony Olson M.D.   [facility] (--)    [address]  [address]  [place] [place] [address]   [phone]           You will be receiving a confirmation call a few days before your appointment from our automated call confirmation system.              Problem List              ICD-10-CM Priority Class Noted - Resolved    Hypertension I10   Unknown - Present    Rotator cuff disorder M67.919   Unknown - Present    Colon polyps K63.5   2010 - Present    Hyperlipidemia E78.5   3/22/2011 - Present    Type 2 diabetes mellitus with renal manifestations (CMS-HCC) E11.29   2015 - Present    Rash R21   2015 - Present    Rheumatoid arthritis, adult (CMS-HCC) M06.9   2015 - Present    Eczema L30.9   3/7/2016 - Present    Other male erectile dysfunction N52.8   3/7/2016 - Present    Insomnia G47.00   3/7/2016 - Present      Health Maintenance        Date Due Completion Dates    IMM PNEUMOCOCCAL 19-64 (ADULT) MEDIUM RISK SERIES (1 of 1 - PPSV23) 1973  ---    RETINAL SCREENING 4/20/2016 4/20/2015 (Done)    Override on 4/20/2015: Done    DIABETES MONOFILAMENT / LE EXAM 3/7/2017 3/7/2016    A1C SCREENING 3/20/2017 9/20/2016, 3/7/2016, 8/25/2015, 8/10/2013, 5/4/2013, 10/23/2012, 7/14/2012, 1/7/2012, 8/2/2011, 3/30/2011    FASTING LIPID PROFILE 9/20/2017 9/20/2016, 8/25/2015, 5/4/2013, 1/5/2013, 10/23/2012, 7/14/2012, 5/2/2012, 1/7/2012, 9/30/2011, 8/2/2011, 3/21/2011    URINE ACR / MICROALBUMIN 9/20/2017 9/20/2016, 8/25/2015, 8/2/2011    SERUM CREATININE 9/20/2017 9/20/2016, 8/25/2015, 11/30/2013, 8/10/2013, 7/16/2013, 5/4/2013, 1/5/2013, 1/5/2013, 10/23/2012, 5/2/2012, 3/12/2012, 1/7/2012, 8/26/2011, 3/21/2011    IMM DTaP/Tdap/Td Vaccine (2 - Td) 12/10/2023 12/10/2013    COLONOSCOPY 8/8/2024 8/8/2014            Current Immunizations     Influenza Vaccine Quad Inj (Preserved) 10/21/2016, 10/7/2015    SHINGLES VACCINE 10/7/2015    Tdap Vaccine 12/10/2013  7:26 AM      Below and/or attached are the medications your provider expects you to take. Review all of your home medications and newly ordered medications with your provider and/or pharmacist. Follow medication instructions as directed by your provider and/or pharmacist. Please keep your medication list with you and share with your provider. Update the information when medications are discontinued, doses are changed, or new medications (including over-the-counter products) are added; and carry medication information at all times in the event of emergency situations     Allergies:  NKDA - (reactions not documented)     OTHER ENVIRONMENTAL - (reactions not documented)               Medications  Valid as of: February 27, 2017 - 12:16 PM    Generic Name Brand Name Tablet Size Instructions for use    ALPRAZolam (Tab) XANAX 0.5 MG Take 1 Tab by mouth at bedtime as needed for Sleep.        AmLODIPine Besylate (Tab) NORVASC 5 MG TAKE 1 TABLET BY MOUTH TWICE A DAY        Blood Glucose Monitoring Suppl (Misc) Blood Glucose  Monitoring Suppl SUPPLIES 1 Strip by Does not apply route 3 times a day. Test strips order: Test strips for One Touch Ultra 2 meter. Sig: use TID and prn ssx high or low sugar #100 RF x 3        Cephalexin (Cap) KEFLEX 500 MG Take 1 Cap by mouth 3 times a day for 10 days.        Clobetasol Propionate (Cream) TEMOVATE 0.05 % Apply 1 Application to affected area(s) 2 times a day.        Folic Acid   Take  by mouth.        Gemfibrozil (Tab) LOPID 600 MG TAKE 1 TABLET BY ORAL ROUTE EVERY DAY 30 MINUTES BEFORE MORNING AND EVENING MEAL        Hydrocodone-Acetaminophen (Tab) NORCO  MG Take 1-2 Tabs by mouth every 6 hours as needed.        Hydroxychloroquine Sulfate (Tab) PLAQUENIL 200 MG Take 200 mg by mouth every day. Indications: Rheumatoid Arthritis        Lancets (Misc) Lancets  1 Each by Does not apply route 3 times a day as needed. Lancets order: Lancets for One Touch Ultra 2 meter. Sig: use TID and prn ssx high or low sugar. #100 RF x 3        Losartan Potassium (Tab) COZAAR 100 MG TAKE 1 TAB BY MOUTH EVERY DAY.        Losartan Potassium (Tab) COZAAR 100 MG TAKE 1 TAB BY MOUTH EVERY DAY.        MetFORMIN HCl (TABLET SR 24 HR) GLUMETZA 1000 MG Take 2 Tabs by mouth every day.        Nabumetone (Tab) RELAFEN 750 MG Take 750 mg by mouth 2 times a day. Indications: Rheumatoid Arthritis        Omega-3 Fatty Acids (Cap) fish oil 1000 MG Take 1,000 mg by mouth 3 times a day, with meals.        PredniSONE (Tab) DELTASONE 5 MG Take 1 Tab by mouth every day.        Rosuvastatin Calcium (Tab) CRESTOR 10 MG TAKE 1 TABLET BY ORAL ROUTE EVERY DAY        Tadalafil (Tab) CIALIS 20 MG Take 1 Tab by mouth as needed for Erectile Dysfunction.        Triamcinolone Acetonide (Cream) KENALOG 0.1 % Apply 1 Application to affected area(s) 2 times a day.        Zolpidem Tartrate (Tab) AMBIEN 10 MG Take 1 Tab by mouth at bedtime as needed for Sleep.        .                 Medicines prescribed today were sent to:     [facility] #[id number]  - [place] - [address] [place] [address]    Phone: [phone] Fax: [phone]    Open 24 Hours?: No      Medication refill instructions:       If your prescription bottle indicates you have medication refills left, it is not necessary to call your provider’s office. Please contact your pharmacy and they will refill your medication.    If your prescription bottle indicates you do not have any refills left, you may request refills at any time through one of the following ways: The online [employer] system (except Urgent Care), by calling your provider’s office, or by asking your pharmacy to contact your provider’s office with a refill request. Medication refills are processed only during regular business hours and may not be available until the next business day. Your provider may request additional information or to have a follow-up visit with you prior to refilling your medication.   *Please Note: Medication refills are assigned a new Rx number when refilled electronically. Your pharmacy may indicate that no refills were authorized even though a new prescription for the same medication is available at the pharmacy. Please request the medicine by name with the pharmacy before contacting your provider for a refill.        Referral     A referral request has been sent to our patient care coordination department. Please allow 3-5 business days for us to process this request and contact you either by phone or mail. If you do not hear from us by the 5th business day, please call us at [phone].           [employer] Status: Patient Declined

## 2017-02-27 NOTE — Clinical Note
February 27, 2017     Aries Cohen  [address]  [place] [address]      Dear Aries:    Thank you for enrolling in [employer]. Please follow the instructions below to securely access your online medical record. [employer] allows you to send messages to your healthcare team, view certain test results, renew your prescriptions, schedule appointments, and more.     How Do I Sign Up?  1. In your Internet browser, go to  [email or web address]  2. Click on the Sign Up Now link in the Sign In box. You will see the New Member Sign Up page.  3. Enter your [employer] Access Code exactly as it appears below (case sensitive). You will not need to use this code after you’ve completed the sign-up process. If you do not sign up before the expiration date, you must request a new code.  [employer] Access Code: Activation code not generated  Current [employer] Status: Patient Declined    4. Enter your Email address and Date of Birth (mm/dd/yyyy) as indicated and click Submit. You will be taken to the next sign-up page.  5. Create a [employer] ID (case sensitive) . This will be your [employer] login ID and cannot be changed, so think of one that is secure and easy to remember.  6. Create a [employer] password  (case sensitive).   · Your password must be a length of at least 6 characters/digits.  · It must include at least 1 numeric.  · You can change your password at any time.  7. Enter your Password Reset Question and Answer. This can be used at a later time if you forget your password.   8. Enter your e-mail address. You will receive e-mail notification when new information is available in [employer].  9. Click Sign Up. You can now view your medical record.     Additional Information  Please contact [employer] Customer Support at [phone] for any questions . Remember, [employer] is NOT to be used for urgent needs. For medical emergencies, dial 911.          Introducing [employer]    [facility]’s Secure, Online Health Connection      [facility] now offers convenient, online access to your healthcare team and personal health information.  [employer] makes managing your healthcare easier than ever, and allows you to:  • Email your healthcare provider securely and privately  • Access your test results  • Request prescription refills 24 hours a day  • View your personal medical records from home  • Schedule or change your appointments  • View your Insurance and Billing Information  • Pay bills online ? Coming soon!        Sign below to get started:  I hereby request access to [employer] application.  I agree to abide by the [employer] Terms and Conditions, which will be provided to me upon activating my account.       __________________________________        _________________________  Name (Please Print)          Date of Birth     __________________________________       _________________________  Signature          Primary Care Provider      _______________  Date                          *For Internal Use Only: Please scan this form into the patient’s chart. Click on  - Select Patient - Attach to Encounter:  - Document Type: Consent   - Document Description: MyChart Consent

## 2017-04-04 ENCOUNTER — TELEPHONE (OUTPATIENT)
Dept: MEDICAL GROUP | Facility: MEDICAL CENTER | Age: 62
End: 2017-04-04

## 2017-04-04 NOTE — TELEPHONE ENCOUNTER
Pt. Was seen 2/3/2017 for DM follow up. Rx for Metformin ER 1000 mg BID was decreased to QD and his BS readings have been around the 190's. Pt.'s med for Crestor and Genfibrozil was discontinued as well and is only taking his BP med along with the metformin. Pt. Saw the Allergist who ran tests and was informed that he's allergic to everything (Was not happy with this specialist). Pt's concerned that he is also allergic to current meds he's on and asked to get in sooner than the 24th in order to discuss meds but I told him that there are no openings unless he'd like to get in to see one of our P.A.'s? But pt declined. Dr. Olson, I requested recent labs form [facility]) but did not find an earlier opening with you, please review his lab results and advise as far as his concern reg meds (increase dose, change med etc..).?    thankx

## 2017-04-06 NOTE — TELEPHONE ENCOUNTER
His diabetes is very poorly controlled. At this point stopping his metformin for a short stretch to see if it helps with his allergy symptoms is a viable option. We will see him at 1120 on November 14. He is going to initially meet with the diabetes nurse to give options. Consideration needs to be given to taking insulin.

## 2017-04-14 ENCOUNTER — OFFICE VISIT (OUTPATIENT)
Dept: MEDICAL GROUP | Facility: MEDICAL CENTER | Age: 62
End: 2017-04-14
Payer: COMMERCIAL

## 2017-04-14 VITALS
BODY MASS INDEX: 28.47 KG/M2 | HEIGHT: 72 IN | DIASTOLIC BLOOD PRESSURE: 80 MMHG | WEIGHT: 210.2 LBS | RESPIRATION RATE: 18 BRPM | HEART RATE: 72 BPM | SYSTOLIC BLOOD PRESSURE: 140 MMHG

## 2017-04-14 DIAGNOSIS — E11.21 TYPE 2 DIABETES MELLITUS WITH DIABETIC NEPHROPATHY, WITHOUT LONG-TERM CURRENT USE OF INSULIN (HCC): ICD-10-CM

## 2017-04-14 PROCEDURE — 99215 OFFICE O/P EST HI 40 MIN: CPT | Performed by: FAMILY MEDICINE

## 2017-04-14 RX ORDER — ROSUVASTATIN CALCIUM 10 MG/1
10 TABLET, COATED ORAL EVERY EVENING
Qty: 90 TAB | Refills: 3
Start: 2017-04-14 | End: 2017-05-12 | Stop reason: SDUPTHER

## 2017-04-14 RX ORDER — ROSUVASTATIN CALCIUM 10 MG/1
TABLET, COATED ORAL
Qty: 30 TAB | Refills: 0 | Status: CANCELLED | OUTPATIENT
Start: 2017-04-14

## 2017-04-14 NOTE — PROGRESS NOTES
RN-BEENA Note    Subjective:     Health changes since last visit/interval Hx: having some severe rash with blistering.  Has been seeing an allergist and was told he is allergic to everything.  He feels that this started after his last prescription for Crestor.  He states his insurance made him go to a generic version.     Medications (including changes made today)  Current Outpatient Prescriptions   Medication Sig Dispense Refill   • amlodipine (NORVASC) 5 MG Tab TAKE 1 TABLET BY MOUTH TWICE A  Tab 0   • losartan (COZAAR) 100 MG Tab TAKE 1 TAB BY MOUTH EVERY DAY. 90 Tab 2   • Blood Glucose Monitoring Suppl SUPPLIES Misc 1 Strip by Does not apply route 3 times a day. Test strips order: Test strips for One Touch Ultra 2 meter. Sig: use TID and prn ssx high or low sugar #100 RF x 3 100 Strip 3   • Lancets Misc 1 Each by Does not apply route 3 times a day as needed. Lancets order: Lancets for One Touch Ultra 2 meter. Sig: use TID and prn ssx high or low sugar. #100 RF x 3 100 Each 3   • metformin ER modified (GLUMETZA) 1000 MG TABLET SR 24 HR Take 2 Tabs by mouth every day. (Patient taking differently: Take 1 Tab by mouth every day.) 180 Tab 3   • alprazolam (XANAX) 0.5 MG Tab Take 1 Tab by mouth at bedtime as needed for Sleep. 60 Tab 1   • predniSONE (DELTASONE) 5 MG Tab Take 1 Tab by mouth every day. 30 Tab 0   • triamcinolone acetonide (KENALOG) 0.1 % Cream Apply 1 Application to affected area(s) 2 times a day. 1 Tube 1   • gemfibrozil (LOPID) 600 MG Tab TAKE 1 TABLET BY ORAL ROUTE EVERY DAY 30 MINUTES BEFORE MORNING AND EVENING MEAL 90 Tab 3   • zolpidem (AMBIEN) 10 MG Tab Take 1 Tab by mouth at bedtime as needed for Sleep. 30 Tab 2   • rosuvastatin (CRESTOR) 10 MG Tab TAKE 1 TABLET BY ORAL ROUTE EVERY DAY 30 Tab 0   • clobetasol (TEMOVATE) 0.05 % Cream Apply 1 Application to affected area(s) 2 times a day. 1 Tube 0   • tadalafil (CIALIS) 20 MG tablet Take 1 Tab by mouth as needed for Erectile Dysfunction. 6  Tab 3   • nabumetone (RELAFEN) 750 MG Tab Take 750 mg by mouth 2 times a day. Indications: Rheumatoid Arthritis     • hydroxychloroquine (PLAQUENIL) 200 MG Tab Take 200 mg by mouth every day. Indications: Rheumatoid Arthritis     • Omega-3 Fatty Acids (FISH OIL) 1000 MG Cap capsule Take 1,000 mg by mouth 3 times a day, with meals.     • FOLIC ACID PO Take  by mouth.     • hydrocodone/acetaminophen (NORCO)  MG TABS Take 1-2 Tabs by mouth every 6 hours as needed.       No current facility-administered medications for this visit.       Taking daily ASA: No  Taking above medications as prescribed: No    Exercise: moderate regular exercise, aerobic < 3 days a week      Health Maintenance:   Health Maintenance Topics with due status: Overdue       Topic Date Due    IMM PNEUMOCOCCAL 19-64 (ADULT) MEDIUM RISK SERIES 05/21/1973    RETINAL SCREENING 04/20/2016    DIABETES MONOFILAMENT / LE EXAM 03/07/2017       Immunizations:   PPSV23: unknown  Oxospyz54: unknown  Tdap: up to date  Flu: up to date      DM:   Last A1c:   Lab Results   Component Value Date/Time    GLYCOHEMOGLOBIN 7.7 03/07/2016 01:17 PM      A1c goal: < 7    Glucose monitoring frequency: testing his blood sugar 0-1 time per day, states he has been having some blood sugars in the 300+ range.  States blood sugars in the 400 range when on Prednisone    Hypoglycemic episodes: no     Last Retinal Exam: current.   Daily Foot Exam: yes      Lab Results   Component Value Date/Time    MICRO ALB CREAT RATIO 56* 08/02/2011 08:41 AM    MICROALBUMIN, URINE RANDOM 5.2 08/02/2011 08:41 AM        ACR Albumin/Creatinine Ratio goal <30 above target         HTN:   Blood pressure goal <140/<80 at target.   Currently Rx ACE/ARB: Yes    Dyslipidemia:    Lab Results   Component Value Date/Time    CHOLESTEROL, 09/28/2013 07:13 AM    LDL 48 05/04/2013 07:11 AM    HDL 35* 09/28/2013 07:13 AM    TRIGLYCERIDES 449* 09/28/2013 07:13 AM       Lab Results   Component Value  Date/Time    SODIUM 135 11/30/2013 08:04 AM    POTASSIUM 4.0 11/30/2013 08:04 AM    CHLORIDE 106 11/30/2013 08:04 AM    CO2 21 11/30/2013 08:04 AM    GLUCOSE 119* 11/30/2013 08:04 AM    BUN 23* 11/30/2013 08:04 AM    CREATININE 0.98 11/30/2013 08:04 AM    BUN-CREATININE RATIO 20 03/21/2011 08:20 AM    GLOM FILT RATE, EST >59 03/21/2011 08:20 AM     Lab Results   Component Value Date/Time    ALKALINE PHOSPHATASE 56 11/30/2013 08:04 AM    AST(SGOT) 34 11/30/2013 08:04 AM    ALT(SGPT) 49 11/30/2013 08:04 AM    TOTAL BILIRUBIN 0.5 11/30/2013 08:04 AM        Currently Rx Statin: No, stopped taking due to rash      He  reports that he has been smoking Cigarettes.  He has a 15 pack-year smoking history. He has never used smokeless tobacco.    Objective:         Plan:     Discussed Diabetic diet discussed in detail, written exchange diet given  Foot care discussed and Podiatry visits  Home glucose monitoring emphasized.               Recommended medication changes: go back to Metformin 1000 bid, patient should check blood sugars more routinely.

## 2017-04-14 NOTE — PROGRESS NOTES
Subjective:     HPI  Chief Complaint   Patient presents with   • Diabetes Mellitus       Aries Cohen is a 61 y.o. male here for follow up of chronic conditions of diabetes mellitus, hypertension, and hyperlipidemia.   RN-CDE Notes reviewed including HPI for DM, HTN, Hyperlipidemia.   Exercise frequency and limitations reviewed.  Feet symptoms reviewed.  Nutritional status reviewed.  Retinal eye exam history reviewed.    Patient additionally reports   Rash:  Chronic and persistent problem. Patient is going to introduce medications one at a time from here forward. We will be seeing his rheumatologist next week.    Patient Active Problem List    Diagnosis Date Noted   • Eczema 03/07/2016   • Other male erectile dysfunction 03/07/2016   • Insomnia 03/07/2016   • Type 2 diabetes mellitus with renal manifestations (CMS-HCC) 08/07/2015   • Rash 08/07/2015   • Rheumatoid arthritis, adult (CMS-HCC) 08/07/2015   • Hyperlipidemia 03/22/2011   • Colon polyps 05/11/2010   • Hypertension    • Rotator cuff disorder        Health Maintenance/Immunizations:   Health Maintenance Topics with due status: Overdue       Topic Date Due    IMM PNEUMOCOCCAL 19-64 (ADULT) MEDIUM RISK SERIES 05/21/1973    RETINAL SCREENING 04/20/2016       Current medications including changes today:  Current Outpatient Prescriptions   Medication Sig Dispense Refill   • rosuvastatin (CRESTOR) 10 MG Tab Take 1 Tab by mouth every evening. 90 Tab 3   • amlodipine (NORVASC) 5 MG Tab TAKE 1 TABLET BY MOUTH TWICE A  Tab 0   • losartan (COZAAR) 100 MG Tab TAKE 1 TAB BY MOUTH EVERY DAY. 90 Tab 2   • Blood Glucose Monitoring Suppl SUPPLIES Misc 1 Strip by Does not apply route 3 times a day. Test strips order: Test strips for One Touch Ultra 2 meter. Sig: use TID and prn ssx high or low sugar #100 RF x 3 100 Strip 3   • Lancets Misc 1 Each by Does not apply route 3 times a day as needed. Lancets order: Lancets for One Touch Ultra 2 meter. Sig: use TID  "and prn ssx high or low sugar. #100 RF x 3 100 Each 3   • metformin ER modified (GLUMETZA) 1000 MG TABLET SR 24 HR Take 2 Tabs by mouth every day. (Patient taking differently: Take 1 Tab by mouth every day.) 180 Tab 3   • alprazolam (XANAX) 0.5 MG Tab Take 1 Tab by mouth at bedtime as needed for Sleep. 60 Tab 1   • predniSONE (DELTASONE) 5 MG Tab Take 1 Tab by mouth every day. 30 Tab 0   • triamcinolone acetonide (KENALOG) 0.1 % Cream Apply 1 Application to affected area(s) 2 times a day. 1 Tube 1   • gemfibrozil (LOPID) 600 MG Tab TAKE 1 TABLET BY ORAL ROUTE EVERY DAY 30 MINUTES BEFORE MORNING AND EVENING MEAL 90 Tab 3   • zolpidem (AMBIEN) 10 MG Tab Take 1 Tab by mouth at bedtime as needed for Sleep. 30 Tab 2   • clobetasol (TEMOVATE) 0.05 % Cream Apply 1 Application to affected area(s) 2 times a day. 1 Tube 0   • tadalafil (CIALIS) 20 MG tablet Take 1 Tab by mouth as needed for Erectile Dysfunction. 6 Tab 3   • nabumetone (RELAFEN) 750 MG Tab Take 750 mg by mouth 2 times a day. Indications: Rheumatoid Arthritis     • hydroxychloroquine (PLAQUENIL) 200 MG Tab Take 200 mg by mouth every day. Indications: Rheumatoid Arthritis     • Omega-3 Fatty Acids (FISH OIL) 1000 MG Cap capsule Take 1,000 mg by mouth 3 times a day, with meals.     • FOLIC ACID PO Take  by mouth.     • hydrocodone/acetaminophen (NORCO)  MG TABS Take 1-2 Tabs by mouth every 6 hours as needed.       No current facility-administered medications for this visit.         ROS  Pertinent  ROS findings as above.   All other systems reviewed and are negative except as per HPI.      Objective:     /80 mmHg  Pulse 72  Resp 18  Ht 1.829 m (6' 0.01\")  Wt 95.346 kg (210 lb 3.2 oz)  BMI 28.50 kg/m2 Body mass index is 28.5 kg/(m^2).     Alert, oriented in no acute distress.  Eye contact is good, speech goal directed, affect calm.  HEENT: conjunctiva non-injected, sclera non-icteric.  Nares patent with no significant congestion or drainage.  Dilcia " pinnae, external auditory canals, TM pearly gray with normal light reflex bilaterally.  Oral mucous membranes pink and moist with no lesions or thrush.  Neck supple with no cervical lymphadenopathy, JVD, palpable thyroid nodules or carotid bruits.  Lungs: clear to auscultation bilaterally with good excursion.  CV: regular rate and rhythm.  Abdomen soft, No CVAT  Lower extremities warm with no edema.  Feet are examined in monofilament exam normal      Assessment/Plan:      1. Type 2 diabetes mellitus with diabetic nephropathy, without long-term current use of insulin (CMS-HCC)  Diabetic Monofilament Lower Extremity Exam       DM2 A1c is not at goal   -RN-CDE notes reviewed and discussed.  -Discussed diet, exercise, disease management and weight loss goals.   -Education and advice provided today: See RN-CDE note.  Additional education and advise, refills and referrals per orders.    Followup:   No Follow-up on file. sooner should new symptoms or problems arise.    The total time spent seeing the patient in consultation, and formulating an action plan for this visit was 40 minutes.  50% of this time was spent counseling, discussing problems documented above, coordinating care and answering questions by the provider and registered nurse--certified diabetes educator.

## 2017-04-24 ENCOUNTER — APPOINTMENT (OUTPATIENT)
Dept: MEDICAL GROUP | Facility: MEDICAL CENTER | Age: 62
End: 2017-04-24
Payer: COMMERCIAL

## 2017-04-26 RX ORDER — ZOLPIDEM TARTRATE 10 MG/1
TABLET ORAL
Qty: 30 TAB | Refills: 2 | Status: SHIPPED
Start: 2017-04-26

## 2017-04-26 NOTE — TELEPHONE ENCOUNTER
Was the patient seen in the last year in this department? Yes   last filled 3/28/2017 #30 tabs  Does patient have an active prescription for medications requested? Yes  2 more days  Received Request Via: Patient

## 2017-05-11 ENCOUNTER — TELEPHONE (OUTPATIENT)
Dept: MEDICAL GROUP | Facility: MEDICAL CENTER | Age: 62
End: 2017-05-11

## 2017-05-11 NOTE — TELEPHONE ENCOUNTER
1. Caller Name: Aries                      Call Back Number: [phone] (home)     2. Message: Pt called stating that he was taken off all his medication a fwe months back due to a rash.    Pt states he saw rheumatoid Arthritis doctor about 11 days ago and was prescribed a new medication Meloxicam.    Pt would like to discuss with Dr. Olson about possibly re-starting his cholesterol medication Crestor.    Per pt, his rash is doing terrific, it almost nonexistent.    Please advise, thank you.    3. Patient approves office to leave a detailed voicemail/MyChart message: yes

## 2017-05-12 DIAGNOSIS — E78.5 HYPERLIPIDEMIA, UNSPECIFIED HYPERLIPIDEMIA TYPE: ICD-10-CM

## 2017-05-12 RX ORDER — ROSUVASTATIN CALCIUM 10 MG/1
10 TABLET, COATED ORAL EVERY EVENING
Qty: 90 TAB | Refills: 3 | Status: SHIPPED | OUTPATIENT
Start: 2017-05-12 | End: 2017-05-25

## 2017-05-12 NOTE — TELEPHONE ENCOUNTER
Was the patient seen in the last year in this department? Yes     Does patient have an active prescription for medications requested? No     Received Request Via: Patient     Pt requesting the brand name of Crestor, no generic please.    Please advise, thank you.

## 2017-05-18 NOTE — TELEPHONE ENCOUNTER
Pt. Left the following mess:  Aries was given rosuvastatin (CRESTOR) although when he went to the pharmacy they gave him a generic brand, he stated Dr. Olson had requested the name brand drug and wanted the referral for the name brand to be called in or sent out to his pharmacy.    I called the pharmacy and they stated that the rx is ready for pt to . I informed pt and he asked that we submit a PAR to his Ins. I did.

## 2017-05-23 ENCOUNTER — TELEPHONE (OUTPATIENT)
Dept: MEDICAL GROUP | Facility: MEDICAL CENTER | Age: 62
End: 2017-05-23

## 2017-05-23 NOTE — TELEPHONE ENCOUNTER
1. Caller Name: Aries Cohen                                         Call Back Number: [phone] (home)       Patient approves a detailed voicemail message: yes    patient is calling about his Crestor. would it be ok to go back to generic being that the patient thought it could be the cause of rash or itry brand name. please advise

## 2017-05-24 NOTE — TELEPHONE ENCOUNTER
Has his rash resolved?  If so, I suggest we continue with name brand Crestor if that is what he got last time.

## 2017-05-25 RX ORDER — ROSUVASTATIN CALCIUM 10 MG/1
10 TABLET, COATED ORAL EVERY EVENING
Qty: 90 TAB | Refills: 3 | Status: SHIPPED | OUTPATIENT
Start: 2017-05-25

## 2017-05-25 NOTE — TELEPHONE ENCOUNTER
I spoke with pt.    Pt states yes his rash has resolved, but not 100%.    Pt states, no that is not was was refilled the last time, it was the generic, he would like the Brand name Crestor.    Please advise, thank you.

## 2017-05-30 ENCOUNTER — TELEPHONE (OUTPATIENT)
Dept: MEDICAL GROUP | Facility: MEDICAL CENTER | Age: 62
End: 2017-05-30

## 2017-05-30 NOTE — TELEPHONE ENCOUNTER
Called for a Prior Auth today and i ws routed to this number [phone]. i asked to start Prior Auth and she stated she will fax over the information.

## 2017-06-02 ENCOUNTER — TELEPHONE (OUTPATIENT)
Dept: MEDICAL GROUP | Facility: MEDICAL CENTER | Age: 62
End: 2017-06-02

## 2017-06-02 DIAGNOSIS — L98.9 SKIN LESION OF BACK: ICD-10-CM

## 2017-06-19 ENCOUNTER — OFFICE VISIT (OUTPATIENT)
Dept: MEDICAL GROUP | Facility: MEDICAL CENTER | Age: 62
End: 2017-06-19
Payer: COMMERCIAL

## 2017-06-19 VITALS
SYSTOLIC BLOOD PRESSURE: 148 MMHG | RESPIRATION RATE: 16 BRPM | BODY MASS INDEX: 28.44 KG/M2 | WEIGHT: 210 LBS | OXYGEN SATURATION: 95 % | HEIGHT: 72 IN | TEMPERATURE: 99 F | DIASTOLIC BLOOD PRESSURE: 80 MMHG | HEART RATE: 95 BPM

## 2017-06-19 DIAGNOSIS — M05.761 RHEUMATOID ARTHRITIS INVOLVING RIGHT KNEE WITH POSITIVE RHEUMATOID FACTOR (HCC): ICD-10-CM

## 2017-06-19 DIAGNOSIS — R21 RASH: ICD-10-CM

## 2017-06-19 DIAGNOSIS — R80.9 TYPE 2 DIABETES MELLITUS WITH MICROALBUMINURIA, WITHOUT LONG-TERM CURRENT USE OF INSULIN (HCC): ICD-10-CM

## 2017-06-19 DIAGNOSIS — I10 ESSENTIAL HYPERTENSION: ICD-10-CM

## 2017-06-19 DIAGNOSIS — E78.2 MIXED HYPERLIPIDEMIA: ICD-10-CM

## 2017-06-19 DIAGNOSIS — E11.29 TYPE 2 DIABETES MELLITUS WITH MICROALBUMINURIA, WITHOUT LONG-TERM CURRENT USE OF INSULIN (HCC): ICD-10-CM

## 2017-06-19 PROCEDURE — 99214 OFFICE O/P EST MOD 30 MIN: CPT | Performed by: INTERNAL MEDICINE

## 2017-06-19 RX ORDER — TRIAMCINOLONE ACETONIDE 40 MG/ML
40 INJECTION, SUSPENSION INTRA-ARTICULAR; INTRAMUSCULAR ONCE
Status: COMPLETED | OUTPATIENT
Start: 2017-06-19 | End: 2017-06-19

## 2017-06-19 RX ADMIN — TRIAMCINOLONE ACETONIDE 40 MG: 40 INJECTION, SUSPENSION INTRA-ARTICULAR; INTRAMUSCULAR at 09:34

## 2017-06-19 NOTE — Clinical Note
[facility]  Anthony Olson M.D.  [address]  [place] [address]  Fax: [phone]   Authorization for Release/Disclosure of   Protected Health Information   Name: ARIES COHEN : 1955 SSN: [id number]   Address: [address] [place] [address]  [place] [address] Phone:    [phone] (home)    I authorize the entity listed below to release/disclose the PHI below to:   [facility]/Anthony Olson M.D. and RESOURCE PROVIDER [facility]   Provider or Entity Name:     Address   City, State, Zip   Phone:    Fax:   Reason for request: continuity of care   Information to be released:    [  ] LAST COLONOSCOPY,  including any PATH REPORT and follow-up  [  ] LAST FIT/COLOGUARD RESULT [  ] LAST DEXA  [  ] LAST MAMMOGRAM  [  ] LAST PAP  [  ] LAST LABS [  ] RETINA EXAM REPORT  [  ] IMMUNIZATION RECORDS  [  ] Release all info      [  ] Check here and initial the line next to each item to release ALL health information INCLUDING  _____ Care and treatment for drug and / or alcohol abuse  _____ HIV testing, infection status, or AIDS  _____ Genetic Testing    DATES OF SERVICE OR TIME PERIOD TO BE DISCLOSED: _____________  I understand and acknowledge that:  * This Authorization may be revoked at any time by you in writing, except if your health information has already been used or disclosed.  * Your health information that will be used or disclosed as a result of you signing this authorization could be re-disclosed by the recipient. If this occurs, your re-disclosed health information may no longer be protected by State or Federal laws.  * You may refuse to sign this Authorization. Your refusal will not affect your ability to obtain treatment.  * This Authorization becomes effective upon signing and will  on (date) __________.      If no date is indicated, this Authorization will  one (1) year from the signature date.    Name: Aries Cohen    Signature:   Date:     2017       PLEASE FAX  REQUESTED RECORDS BACK TO: [phone]

## 2017-06-19 NOTE — PROGRESS NOTES
Chief Complaint   Patient presents with   • Follow-Up     HTN and discuss medications.       HISTORY OF PRESENT ILLNESS: Patient is a 62 y.o. male patient who presents today to discuss the evaluation and management of:    1. Rash    Patient is here today complaining of a rash that he's had for approximately 3 weeks. The patient first developed a skin eruption about 6 months ago he was seen by an allergist and felt to have a reaction to formaldehyde which is present in many topical shampoos soaps and lotions. He also was taken off all of his medications at that time. Since he is slowly reintroduced his 2 blood pressure medications and his diabetes med. The rash did eventually resolve when he received steroid injection at the dermatologist. The rash is extremely pruritic and he feels that he it inhibits him socially and interrupts his sleep. He is requesting another steroid injection today. Of note, the rash is not as bad as it was this spring when it first erupted. Interestingly, the patient had marked eosinophilia in March when his rash was at its height. His eosinophil count was greater than 1000.      2. Essential hypertension    Patient takes amlodipine 5 mg and Cozaar 100 mg for his blood pressure which has been controlled.    3. Rheumatoid arthritis involving right knee with positive rheumatoid factor (CMS-HCC)    Patient saw the rheumatologist last month. He was taken off of all of his medications and still is doing reasonably well. He does have some stiffness in his knees however.    4. Mixed hyperlipidemia    Patient is not taking Crestor for 6 months. He does have prior authorization to receive non-generic Crestor and is going to be filling that in the near future. When he was on it his lipids were very well controlled. He resumed his gemfibrozil several weeks ago and this is when the most current rash erupted, therefore he is discontinuing it and we are considering it as a potential allergen.    5. Type 2  diabetes mellitus with microalbuminuria, without long-term current use of insulin (CMS-HCC)    Patient does not check his blood sugar regularly, however when last checked his random about a week ago was 137. He currently is taking metformin 1000 mg twice a day.      Patient Active Problem List    Diagnosis Date Noted   • Eczema 03/07/2016   • Other male erectile dysfunction 03/07/2016   • Insomnia 03/07/2016   • Type 2 diabetes mellitus with renal manifestations (CMS-HCC) 08/07/2015   • Rash 08/07/2015   • Rheumatoid arthritis, adult (CMS-HCC) 08/07/2015   • Hyperlipidemia 03/22/2011   • Colon polyps 05/11/2010   • Hypertension    • Rotator cuff disorder       Allergies:Nkda; Other environmental; and Rosuvastatin    Current meds including changes today  Current Outpatient Prescriptions   Medication Sig Dispense Refill   • amlodipine (NORVASC) 5 MG Tab TAKE 1 TABLET BY MOUTH TWICE A  Tab 0   • losartan (COZAAR) 100 MG Tab TAKE 1 TAB BY MOUTH EVERY DAY. 90 Tab 2   • Blood Glucose Monitoring Suppl SUPPLIES Misc 1 Strip by Does not apply route 3 times a day. Test strips order: Test strips for One Touch Ultra 2 meter. Sig: use TID and prn ssx high or low sugar #100 RF x 3 100 Strip 3   • Lancets Misc 1 Each by Does not apply route 3 times a day as needed. Lancets order: Lancets for One Touch Ultra 2 meter. Sig: use TID and prn ssx high or low sugar. #100 RF x 3 100 Each 3   • metformin ER modified (GLUMETZA) 1000 MG TABLET SR 24 HR Take 2 Tabs by mouth every day. (Patient taking differently: Take 1 Tab by mouth every day.) 180 Tab 3   • rosuvastatin (CRESTOR) 10 MG Tab Take 1 Tab by mouth every evening. 90 Tab 3   • zolpidem (AMBIEN) 10 MG Tab TAKE 1 TABLET BY MOUTH AT BEDTIME AS NEEDED FOR SLEEP 30 Tab 2   • Omega-3 Fatty Acids (FISH OIL) 1000 MG Cap capsule Take 1,000 mg by mouth 3 times a day, with meals.     • FOLIC ACID PO Take  by mouth.     • hydrocodone/acetaminophen (NORCO)  MG TABS Take 1-2 Tabs by  mouth every 6 hours as needed.       Current Facility-Administered Medications   Medication Dose Route Frequency Provider Last Rate Last Dose   • triamcinolone acetonide (KENALOG-40) injection 40 mg  40 mg Intramuscular Once Yelitza Mauro M.D.         Social History   Substance Use Topics   • Smoking status: Former Smoker -- 0.50 packs/day for 30 years     Types: Cigarettes     Quit date: 05/26/2016   • Smokeless tobacco: Never Used   • Alcohol Use: 2.4 oz/week     4 Standard drinks or equivalent per week      Comment: mod on occasion     Social History     Social History Narrative       Family History   Problem Relation Age of Onset   • Heart Disease Mother      MI, age 73   • Cancer Father      glioblasoma, age 82   • Stroke Mother    • Diabetes Paternal Grandfather    • Heart Attack Mother        Review of Systems:  No chest pain, No shortness of breath, No dyspnea on exertion  Gastrointestinal ROS: No abdominal pain, No nausea, vomiting, diarrhea, or constipation        Exam:    2  Blood pressure 148/80, pulse 95, temperature 37.2 °C (99 °F), resp. rate 16, height 1.829 m (6'), weight 95.255 kg (210 lb), SpO2 95 %.  General:  Well nourished, well developed male in NAD affect and mood within normal limits  Head is grossly normal.  Neck: Supple without adenopathy  Pulmonary: Clear to ausculation.  Normal effort. No rales, rhonchi, or wheezing.  Cardiovascular: Regular rate and rhythm without murmur.   Extremities: no clubbing, cyanosis, or edema.  Neuro: moves all extremities symmetrically  Skin: Patient has 3 erythematous scaly patches that appear eczema-like. 2 on his thigh and one on his left hip.        Please note that this dictation was created using voice recognition software. I have made every reasonable attempt to correct obvious errors, but I expect that there are errors of grammar and possibly content that I did not discover before finalizing the note.    Assessment/Plan:  1. Rash    We'll repeat  CBC to evaluate eosinophilia, also has requested give patient a injection today. Patient will also now no longer take his gemfibrozil. He has a follow-up with his allergist in several weeks.  - CBC WITH DIFFERENTIAL; Future  - triamcinolone acetonide (KENALOG-40) injection 40 mg; 1 mL by Intramuscular route Once.    2. Essential hypertension    BP stable continue medications.    3. Rheumatoid arthritis involving right knee with positive rheumatoid factor (CMS-[facility])    Stable continue off meds.    4. Mixed hyperlipidemia    Patient to resume non-generic Crestor. We'll need to check lipid panel in several months.    5. Type 2 diabetes mellitus with microalbuminuria, without long-term current use of insulin (CMS-HCC)    Patient has not had hemoglobin A1c for almost one year. We'll repeat at this time.  2  - HEMOGLOBIN A1C; Future    Followup: Patient will make follow-up appointment in approximately 2 months with myself or with Dr. Olson

## 2017-07-11 ENCOUNTER — RX ONLY (OUTPATIENT)
Age: 62
Setting detail: RX ONLY
End: 2017-07-11

## 2017-07-11 PROBLEM — D22.5 MELANOCYTIC NEVI OF TRUNK: Status: ACTIVE | Noted: 2017-07-11

## 2017-07-11 PROBLEM — D22.62 MELANOCYTIC NEVI OF LEFT UPPER LIMB, INCLUDING SHOULDER: Status: ACTIVE | Noted: 2017-07-11

## 2017-07-11 PROBLEM — D22.72 MELANOCYTIC NEVI OF LEFT LOWER LIMB, INCLUDING HIP: Status: ACTIVE | Noted: 2017-07-11

## 2017-08-10 ENCOUNTER — RX ONLY (OUTPATIENT)
Age: 62
Setting detail: RX ONLY
End: 2017-08-10

## 2017-10-16 ENCOUNTER — APPOINTMENT (OUTPATIENT)
Dept: MEDICAL GROUP | Facility: MEDICAL CENTER | Age: 62
End: 2017-10-16
Payer: COMMERCIAL

## 2017-10-17 ENCOUNTER — APPOINTMENT (RX ONLY)
Dept: URBAN - METROPOLITAN AREA CLINIC 4 | Facility: CLINIC | Age: 62
Setting detail: DERMATOLOGY
End: 2017-10-17

## 2017-10-17 DIAGNOSIS — L93.1 SUBACUTE CUTANEOUS LUPUS ERYTHEMATOSUS: ICD-10-CM

## 2017-10-17 PROBLEM — E78.5 HYPERLIPIDEMIA, UNSPECIFIED: Status: ACTIVE | Noted: 2017-10-17

## 2017-10-17 PROBLEM — I10 ESSENTIAL (PRIMARY) HYPERTENSION: Status: ACTIVE | Noted: 2017-10-17

## 2017-10-17 PROBLEM — M12.9 ARTHROPATHY, UNSPECIFIED: Status: ACTIVE | Noted: 2017-10-17

## 2017-10-17 PROBLEM — L57.0 ACTINIC KERATOSIS: Status: ACTIVE | Noted: 2017-10-17

## 2017-10-17 PROBLEM — E13.9 OTHER SPECIFIED DIABETES MELLITUS WITHOUT COMPLICATIONS: Status: ACTIVE | Noted: 2017-10-17

## 2017-10-17 PROCEDURE — 99213 OFFICE O/P EST LOW 20 MIN: CPT

## 2017-10-17 PROCEDURE — ? COUNSELING: TOPICAL STEROIDS

## 2017-10-17 PROCEDURE — ? TREATMENT REGIMEN

## 2017-10-17 ASSESSMENT — LOCATION DETAILED DESCRIPTION DERM
LOCATION DETAILED: LEFT POSTERIOR SHOULDER
LOCATION DETAILED: LEFT MEDIAL SUPERIOR CHEST
LOCATION DETAILED: RIGHT SUPERIOR MEDIAL UPPER BACK
LOCATION DETAILED: RIGHT POSTERIOR SHOULDER

## 2017-10-17 ASSESSMENT — LOCATION SIMPLE DESCRIPTION DERM
LOCATION SIMPLE: LEFT SHOULDER
LOCATION SIMPLE: CHEST
LOCATION SIMPLE: RIGHT UPPER BACK
LOCATION SIMPLE: RIGHT SHOULDER

## 2017-10-17 ASSESSMENT — LOCATION ZONE DERM
LOCATION ZONE: TRUNK
LOCATION ZONE: ARM

## 2017-10-17 NOTE — PROCEDURE: TREATMENT REGIMEN
Detail Level: Zone
Plan: Cont TAC cream 4x a week, cont mtx as prescribed by Dr. Bartlett, discussed w/ pt Dr. Bartlett’s plan to get him started on plaquenil, pt to continue following up with Dr. Bartlett
Otc Regimen: Cetaphil cream

## 2017-10-17 NOTE — HPI: RASH (SUBACUTE CUTANEOUS LUPUS ERYTHEMATOSUS)
Is This A New Presentation, Or A Follow-Up?: Follow Up Subacute Cutaneous Lupus Erythematosus
Additional History: Recently prescribed mtx 15 mg a week and folic acid 800 mg by Dr. Bartlett for lupus and RA, since he started he has noticed loss of appetite and anxiety, has been applying sunscreen and avoiding sun exposure, has not been using TAC cream as prescribed only used it a few times.

## 2017-11-08 ENCOUNTER — APPOINTMENT (RX ONLY)
Dept: URBAN - METROPOLITAN AREA CLINIC 4 | Facility: CLINIC | Age: 62
Setting detail: DERMATOLOGY
End: 2017-11-08

## 2017-11-08 ENCOUNTER — RX ONLY (OUTPATIENT)
Age: 62
Setting detail: RX ONLY
End: 2017-11-08

## 2017-11-08 DIAGNOSIS — L85.3 XEROSIS CUTIS: ICD-10-CM

## 2017-11-08 PROBLEM — L30.9 DERMATITIS, UNSPECIFIED: Status: ACTIVE | Noted: 2017-11-08

## 2017-11-08 PROCEDURE — ? COUNSELING

## 2017-11-08 PROCEDURE — ? PRESCRIPTION

## 2017-11-08 PROCEDURE — 99213 OFFICE O/P EST LOW 20 MIN: CPT

## 2017-11-08 PROCEDURE — ? TREATMENT REGIMEN

## 2017-11-08 PROCEDURE — ? MEDICATION COUNSELING

## 2017-11-08 RX ORDER — TRIAMCINOLONE ACETONIDE 1 MG/G
OINTMENT TOPICAL
Qty: 1 | Refills: 2 | Status: ERX | COMMUNITY
Start: 2017-11-08

## 2017-11-08 RX ORDER — PREDNISONE 20 MG/1
TABLET ORAL
Qty: 42 | Refills: 0 | Status: ERX | COMMUNITY
Start: 2017-11-08

## 2017-11-08 RX ORDER — CLOBETASOL PROPIONATE 0.5 MG/G
OINTMENT TOPICAL
Qty: 2 | Refills: 1 | Status: ERX | COMMUNITY
Start: 2017-11-08

## 2017-11-08 RX ORDER — DOXYCYCLINE HYCLATE 100 MG/1
TABLET, COATED ORAL
Qty: 14 | Refills: 0 | Status: ERX

## 2017-11-08 RX ADMIN — CLOBETASOL PROPIONATE: 0.5 OINTMENT TOPICAL at 00:00

## 2017-11-08 RX ADMIN — TRIAMCINOLONE ACETONIDE: 1 OINTMENT TOPICAL at 00:00

## 2017-11-08 ASSESSMENT — LOCATION DETAILED DESCRIPTION DERM
LOCATION DETAILED: LEFT ANTERIOR DISTAL THIGH
LOCATION DETAILED: RIGHT DISTAL PRETIBIAL REGION
LOCATION DETAILED: LEFT DISTAL PRETIBIAL REGION

## 2017-11-08 ASSESSMENT — LOCATION SIMPLE DESCRIPTION DERM
LOCATION SIMPLE: LEFT PRETIBIAL REGION
LOCATION SIMPLE: LEFT THIGH
LOCATION SIMPLE: RIGHT PRETIBIAL REGION

## 2017-11-08 ASSESSMENT — LOCATION ZONE DERM: LOCATION ZONE: LEG

## 2018-03-20 ENCOUNTER — APPOINTMENT (RX ONLY)
Dept: URBAN - METROPOLITAN AREA CLINIC 4 | Facility: CLINIC | Age: 63
Setting detail: DERMATOLOGY
End: 2018-03-20

## 2018-03-20 DIAGNOSIS — L93.1 SUBACUTE CUTANEOUS LUPUS ERYTHEMATOSUS: ICD-10-CM

## 2018-03-20 DIAGNOSIS — L82.0 INFLAMED SEBORRHEIC KERATOSIS: ICD-10-CM

## 2018-03-20 DIAGNOSIS — L85.3 XEROSIS CUTIS: ICD-10-CM

## 2018-03-20 PROBLEM — L30.9 DERMATITIS, UNSPECIFIED: Status: ACTIVE | Noted: 2018-03-20

## 2018-03-20 PROCEDURE — ? TREATMENT REGIMEN

## 2018-03-20 PROCEDURE — 99214 OFFICE O/P EST MOD 30 MIN: CPT | Mod: 25

## 2018-03-20 PROCEDURE — ? LIQUID NITROGEN

## 2018-03-20 PROCEDURE — ? ADDITIONAL NOTES

## 2018-03-20 PROCEDURE — ? COUNSELING

## 2018-03-20 PROCEDURE — 17110 DESTRUCTION B9 LES UP TO 14: CPT

## 2018-03-20 ASSESSMENT — LOCATION DETAILED DESCRIPTION DERM
LOCATION DETAILED: LEFT ANTERIOR DISTAL THIGH
LOCATION DETAILED: RIGHT DISTAL PRETIBIAL REGION
LOCATION DETAILED: LEFT DISTAL PRETIBIAL REGION
LOCATION DETAILED: RIGHT CENTRAL EYEBROW

## 2018-03-20 ASSESSMENT — LOCATION SIMPLE DESCRIPTION DERM
LOCATION SIMPLE: RIGHT EYEBROW
LOCATION SIMPLE: RIGHT PRETIBIAL REGION
LOCATION SIMPLE: LEFT PRETIBIAL REGION
LOCATION SIMPLE: LEFT THIGH

## 2018-03-20 ASSESSMENT — LOCATION ZONE DERM
LOCATION ZONE: FACE
LOCATION ZONE: LEG

## 2018-03-20 NOTE — PROCEDURE: TREATMENT REGIMEN
Initiate Treatment: Triamcinolone oint BID x 2 wks
Detail Level: Zone
Otc Regimen: Pt is to start zinc based sunscreen and ceraVe daily

## 2018-03-20 NOTE — PROCEDURE: LIQUID NITROGEN
Detail Level: Detailed
Post-Care Instructions: I reviewed with the patient in detail post-care instructions. Patient is to wear sunprotection, and avoid picking at any of the treated lesions. Pt may apply Vaseline to crusted or scabbing areas.
Include Z78.9 (Other Specified Conditions Influencing Health Status) As An Associated Diagnosis?: No
Medical Necessity Information: It is in your best interest to select a reason for this procedure from the list below. All of these items fulfill various CMS LCD requirements except the new and changing color options.
Consent: The patient's consent was obtained including but not limited to risks of crusting, scabbing, blistering, scarring, darker or lighter pigmentary change, recurrence, incomplete removal and infection.
Medical Necessity Clause: This procedure was medically necessary because the lesions that were treated were:

## 2018-04-07 ENCOUNTER — OFFICE VISIT (OUTPATIENT)
Dept: URGENT CARE | Facility: CLINIC | Age: 63
End: 2018-04-07
Payer: COMMERCIAL

## 2018-04-07 VITALS
HEART RATE: 88 BPM | TEMPERATURE: 98.3 F | BODY MASS INDEX: 28.44 KG/M2 | WEIGHT: 210 LBS | HEIGHT: 72 IN | SYSTOLIC BLOOD PRESSURE: 126 MMHG | OXYGEN SATURATION: 94 % | RESPIRATION RATE: 16 BRPM | DIASTOLIC BLOOD PRESSURE: 54 MMHG

## 2018-04-07 DIAGNOSIS — R07.9 CHEST PAIN, UNSPECIFIED TYPE: ICD-10-CM

## 2018-04-07 DIAGNOSIS — A08.4 VIRAL GASTROENTERITIS: ICD-10-CM

## 2018-04-07 DIAGNOSIS — S39.012A STRAIN OF LUMBAR REGION, INITIAL ENCOUNTER: ICD-10-CM

## 2018-04-07 DIAGNOSIS — R11.2 NAUSEA AND VOMITING, INTRACTABILITY OF VOMITING NOT SPECIFIED, UNSPECIFIED VOMITING TYPE: ICD-10-CM

## 2018-04-07 LAB
APPEARANCE UR: NORMAL
BILIRUB UR STRIP-MCNC: NORMAL MG/DL
COLOR UR AUTO: YELLOW
GLUCOSE UR STRIP.AUTO-MCNC: 2000 MG/DL
KETONES UR STRIP.AUTO-MCNC: NORMAL MG/DL
LEUKOCYTE ESTERASE UR QL STRIP.AUTO: NORMAL
NITRITE UR QL STRIP.AUTO: NORMAL
PH UR STRIP.AUTO: 5 [PH] (ref 5–8)
PROT UR QL STRIP: 300 MG/DL
RBC UR QL AUTO: NORMAL
SP GR UR STRIP.AUTO: 1.02
UROBILINOGEN UR STRIP-MCNC: NORMAL MG/DL

## 2018-04-07 PROCEDURE — 99214 OFFICE O/P EST MOD 30 MIN: CPT | Performed by: FAMILY MEDICINE

## 2018-04-07 PROCEDURE — 81002 URINALYSIS NONAUTO W/O SCOPE: CPT | Performed by: FAMILY MEDICINE

## 2018-04-07 RX ORDER — DIPHENOXYLATE HYDROCHLORIDE AND ATROPINE SULFATE 2.5; .025 MG/1; MG/1
1 TABLET ORAL 4 TIMES DAILY PRN
Qty: 15 TAB | Refills: 0 | Status: SHIPPED | OUTPATIENT
Start: 2018-04-07 | End: 2018-04-14

## 2018-04-07 RX ORDER — DAPAGLIFLOZIN AND METFORMIN HYDROCHLORIDE 5; 1000 MG/1; MG/1
TABLET, FILM COATED, EXTENDED RELEASE ORAL
COMMUNITY

## 2018-04-07 RX ORDER — ONDANSETRON 4 MG/1
4 TABLET, ORALLY DISINTEGRATING ORAL EVERY 8 HOURS PRN
Qty: 10 TAB | Refills: 0 | Status: SHIPPED | OUTPATIENT
Start: 2018-04-07

## 2018-04-07 RX ORDER — AZATHIOPRINE 50 MG/1
50 TABLET ORAL DAILY
COMMUNITY

## 2018-04-07 NOTE — PATIENT INSTRUCTIONS
Food Choices to Help Relieve Diarrhea, Adult  When you have diarrhea, the foods you eat and your eating habits are very important. Choosing the right foods and drinks can help relieve diarrhea. Also, because diarrhea can last up to 7 days, you need to replace lost fluids and electrolytes (such as sodium, potassium, and chloride) in order to help prevent dehydration.   WHAT GENERAL GUIDELINES DO I NEED TO FOLLOW?  · Slowly drink 1 cup (8 oz) of fluid for each episode of diarrhea. If you are getting enough fluid, your urine will be clear or pale yellow.  · Eat starchy foods. Some good choices include white rice, white toast, pasta, low-fiber cereal, baked potatoes (without the skin), saltine crackers, and bagels.  · Avoid large servings of any cooked vegetables.  · Limit fruit to two servings per day. A serving is ½ cup or 1 small piece.  · Choose foods with less than 2 g of fiber per serving.  · Limit fats to less than 8 tsp (38 g) per day.  · Avoid fried foods.  · Eat foods that have probiotics in them. Probiotics can be found in certain dairy products.  · Avoid foods and beverages that may increase the speed at which food moves through the stomach and intestines (gastrointestinal tract). Things to avoid include:  ¨ High-fiber foods, such as dried fruit, raw fruits and vegetables, nuts, seeds, and whole grain foods.  ¨ Spicy foods and high-fat foods.  ¨ Foods and beverages sweetened with high-fructose corn syrup, honey, or sugar alcohols such as xylitol, sorbitol, and mannitol.  WHAT FOODS ARE RECOMMENDED?  Grains  White rice. White, Fijian, or hossein breads (fresh or toasted), including plain rolls, buns, or bagels. White pasta. Saltine, soda, or alesha crackers. Pretzels. Low-fiber cereal. Cooked cereals made with water (such as cornmeal, farina, or cream cereals). Plain muffins. Matzo. Elvia toast. Zwieback.   Vegetables  Potatoes (without the skin). Strained tomato and vegetable juices. Most well-cooked and canned  vegetables without seeds. Tender lettuce.  Fruits  Cooked or canned applesauce, apricots, cherries, fruit cocktail, grapefruit, peaches, pears, or plums. Fresh bananas, apples without skin, cherries, grapes, cantaloupe, grapefruit, peaches, oranges, or plums.   Meat and Other Protein Products  Baked or boiled chicken. Eggs. Tofu. Fish. Seafood. Smooth peanut butter. Ground or well-cooked tender beef, ham, veal, lamb, pork, or poultry.   Dairy  Plain yogurt, kefir, and unsweetened liquid yogurt. Lactose-free milk, buttermilk, or soy milk. Plain hard cheese.  Beverages  Sport drinks. Clear broths. Diluted fruit juices (except prune). Regular, caffeine-free sodas such as ginger ale. Water. Decaffeinated teas. Oral rehydration solutions. Sugar-free beverages not sweetened with sugar alcohols.  Other  Bouillon, broth, or soups made from recommended foods.   The items listed above may not be a complete list of recommended foods or beverages. Contact your dietitian for more options.  WHAT FOODS ARE NOT RECOMMENDED?  Grains  Whole grain, whole wheat, bran, or rye breads, rolls, pastas, crackers, and cereals. Wild or brown rice. Cereals that contain more than 2 g of fiber per serving. Corn tortillas or taco shells. Cooked or dry oatmeal. Granola. Popcorn.  Vegetables  Raw vegetables. Cabbage, broccoli, [place] sprouts, artichokes, baked beans, beet greens, corn, kale, legumes, peas, sweet potatoes, and yams. Potato skins. Cooked spinach and cabbage.  Fruits  Dried fruit, including raisins and dates. Raw fruits. Stewed or dried prunes. Fresh apples with skin, apricots, mangoes, pears, raspberries, and strawberries.   Meat and Other Protein Products  [place] peanut butter. Nuts and seeds. Beans and lentils. Weaver.   Dairy  High-fat cheeses. Milk, chocolate milk, and beverages made with milk, such as milk shakes. Cream. Ice cream.  Sweets and Desserts  Sweet rolls, doughnuts, and sweet breads. Pancakes and waffles.  Fats and  Oils  Butter. Cream sauces. Margarine. Salad oils. Plain salad dressings. Olives. Avocados.   Beverages  Caffeinated beverages (such as coffee, tea, soda, or energy drinks). Alcoholic beverages. Fruit juices with pulp. Prune juice. Soft drinks sweetened with high-fructose corn syrup or sugar alcohols.  Other  Coconut. Hot sauce. Chili powder. Mayonnaise. Gravy. Cream-based or milk-based soups.   The items listed above may not be a complete list of foods and beverages to avoid. Contact your dietitian for more information.  WHAT SHOULD I DO IF I BECOME DEHYDRATED?  Diarrhea can sometimes lead to dehydration. Signs of dehydration include dark urine and dry mouth and skin. If you think you are dehydrated, you should rehydrate with an oral rehydration solution. These solutions can be purchased at pharmacies, retail stores, or online.   Drink ½-1 cup (120-240 mL) of oral rehydration solution each time you have an episode of diarrhea. If drinking this amount makes your diarrhea worse, try drinking smaller amounts more often. For example, drink 1-3 tsp (5-15 mL) every 5-10 minutes.   A general rule for staying hydrated is to drink 1½-2 L of fluid per day. Talk to your health care provider about the specific amount you should be drinking each day. Drink enough fluids to keep your urine clear or pale yellow.     This information is not intended to replace advice given to you by your health care provider. Make sure you discuss any questions you have with your health care provider.     Document Released: 03/09/2005 Document Revised: 01/08/2016 Document Reviewed: 11/10/2014  [employer] Interactive Patient Education ©2016 [employer]

## 2018-04-07 NOTE — PROGRESS NOTES
"  Chief Complaint   Patient presents with   • Diarrhea     Emesis / stomach pain / back pain/ no sleep painful when laying down x 1 week    • Emesis           Diarrhea   This is a new problem. The current episode started in the past 7 days. The problem occurs 6-7  times per day. The problem has been unchanged. The stool consistency is described as watery. The patient states that diarrhea does not awaken from sleep.  + nausea and one episode of emesis.    Associated symptoms includes:  abd pain - he describes the pain as \"soreness\" and the pain is intermittent.   He also had one episode of substernal chest pain 2 d ago that lasted for 1 minute and spontaneously resolved.    He denie any sob.   Pertinent negatives include no   chills, coughing, fever, headaches,  or weight loss. Nothing aggravates the symptoms. There are no known risk factors. Patient has tried nothing for the symptoms. There is no history of inflammatory bowel disease.   He also c/o some ongoing lumbar back pain, but denies any dysuria, hematuria      Social History     Social History   • Marital status: Single     Spouse name: N/A   • Number of children: N/A   • Years of education: N/A     Occupational History   • Not on file.     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   Respiratory: Negative for cough and wheezing.    Cardiovascular: denies palpitations, PND  Gastrointestinal: Positive for diarrhea, n/v.   . Negative for blood in stool.   Neurological: Negative for dizziness and headaches.   All other systems reviewed and are negative.         Objective:     Blood pressure 126/54, pulse 88, temperature 36.8 °C (98.3 °F), resp. rate 16, height 1.829 m (6'), weight 95.3 kg (210 lb), SpO2 94 %.    Physical Exam   Constitutional: pt is oriented to person, place, and time and appears well-developed. No distress.   HENT:   Head: Normocephalic and atraumatic.   Mouth/Throat: No oropharyngeal exudate.   Eyes: Conjunctivae are normal. No scleral icterus.   Cardiovascular: Normal rate, regular rhythm and normal heart sounds.    Pulmonary/Chest: Effort normal and breath sounds normal. No respiratory distress. Pt has no wheezes. Pt has no rales.   There is no tenderness over chest wall.   Abdominal: Normal appearance and bowel sounds are normal. There is no splenomegaly or hepatomegaly. There is no tenderness. There is no rebound, no guarding, no CVA tenderness and no tenderness at McBurney's point.   Lymphadenopathy:     Pt has no cervical adenopathy.   Neurological: pt is alert and oriented to person, place, and time.   Skin: Skin is warm. Pt is not diaphoretic. No erythema.   Psychiatric:  behavior is normal.   Nursing note and vitals reviewed.         EKG interpretation - normal sinus rhythm. No ST or QRS morphology changes. QT interval is normal. Axis is positive. No signs of ischemia.       Assessment/Plan:          1. Viral gastroenteritis  No acute abdomen  BRAT diet  Push fluids.   Rx zofran, lomotil for sx control .       2. Strain of lumbar region, initial encounter   UA shows protien and glucose, both chronic findings, so no evidence of kidney stones or infection  F/u with PCP    3. Chest pain  Resolved  EKG is reassuring  F/u PCP  -  DX-LUMBAR SPINE-2 OR 3 VIEWS; Future

## 2018-12-31 ENCOUNTER — APPOINTMENT (RX ONLY)
Dept: URBAN - METROPOLITAN AREA CLINIC 4 | Facility: CLINIC | Age: 63
Setting detail: DERMATOLOGY
End: 2018-12-31

## 2018-12-31 DIAGNOSIS — L85.3 XEROSIS CUTIS: ICD-10-CM | Status: RESOLVED

## 2018-12-31 DIAGNOSIS — L82.1 OTHER SEBORRHEIC KERATOSIS: ICD-10-CM

## 2018-12-31 PROCEDURE — ? ADDITIONAL NOTES

## 2018-12-31 PROCEDURE — ? COUNSELING

## 2018-12-31 PROCEDURE — 99213 OFFICE O/P EST LOW 20 MIN: CPT

## 2018-12-31 PROCEDURE — ? LIQUID NITROGEN

## 2018-12-31 ASSESSMENT — LOCATION SIMPLE DESCRIPTION DERM
LOCATION SIMPLE: RIGHT ANKLE
LOCATION SIMPLE: RIGHT ANKLE

## 2018-12-31 ASSESSMENT — LOCATION ZONE DERM
LOCATION ZONE: LEG
LOCATION ZONE: LEG

## 2018-12-31 ASSESSMENT — LOCATION DETAILED DESCRIPTION DERM
LOCATION DETAILED: RIGHT LATERAL POSTERIOR ANKLE
LOCATION DETAILED: RIGHT LATERAL POSTERIOR ANKLE

## 2018-12-31 NOTE — PROCEDURE: LIQUID NITROGEN
Medical Necessity Clause: This procedure was medically necessary because the lesions that were treated were:
Post-Care Instructions: I reviewed with the patient in detail post-care instructions. Patient is to wear sunprotection, and avoid picking at any of the treated lesions. Pt may apply Vaseline to crusted or scabbing areas.
Detail Level: Detailed
Render Post-Care Instructions In Note?: no
Medical Necessity Information: It is in your best interest to select a reason for this procedure from the list below. All of these items fulfill various CMS LCD requirements except the new and changing color options.
Duration Of Freeze Thaw-Cycle (Seconds): 0
Consent: The patient's consent was obtained including but not limited to risks of crusting, scabbing, blistering, scarring, darker or lighter pigmentary change, recurrence, incomplete removal and infection.

## 2020-02-19 ENCOUNTER — APPOINTMENT (RX ONLY)
Dept: URBAN - METROPOLITAN AREA CLINIC 4 | Facility: CLINIC | Age: 65
Setting detail: DERMATOLOGY
End: 2020-02-19

## 2020-02-19 DIAGNOSIS — M06.9 RHEUMATOID ARTHRITIS, UNSPECIFIED: ICD-10-CM

## 2020-02-19 DIAGNOSIS — D22 MELANOCYTIC NEVI: ICD-10-CM

## 2020-02-19 DIAGNOSIS — L81.4 OTHER MELANIN HYPERPIGMENTATION: ICD-10-CM

## 2020-02-19 PROBLEM — D22.5 MELANOCYTIC NEVI OF TRUNK: Status: ACTIVE | Noted: 2020-02-19

## 2020-02-19 PROBLEM — D23.39 OTHER BENIGN NEOPLASM OF SKIN OF OTHER PARTS OF FACE: Status: ACTIVE | Noted: 2020-02-19

## 2020-02-19 PROCEDURE — 99213 OFFICE O/P EST LOW 20 MIN: CPT

## 2020-02-19 PROCEDURE — ? TREATMENT REGIMEN

## 2020-02-19 PROCEDURE — ? COUNSELING

## 2020-02-19 ASSESSMENT — LOCATION DETAILED DESCRIPTION DERM
LOCATION DETAILED: RIGHT SUPERIOR MEDIAL UPPER BACK
LOCATION DETAILED: LEFT SUPERIOR UPPER BACK
LOCATION DETAILED: INFERIOR MID FOREHEAD

## 2020-02-19 ASSESSMENT — LOCATION ZONE DERM
LOCATION ZONE: FACE
LOCATION ZONE: TRUNK

## 2020-02-19 ASSESSMENT — LOCATION SIMPLE DESCRIPTION DERM
LOCATION SIMPLE: INFERIOR FOREHEAD
LOCATION SIMPLE: RIGHT UPPER BACK
LOCATION SIMPLE: LEFT UPPER BACK

## 2020-02-19 NOTE — PROCEDURE: MIPS QUALITY
Quality 337: Tuberculosis Prevention For Psoriasis And Psoriatic Arthritis Patients On A Biological Immune Response Modifier: Patient has a documented negative annual TB screening.
Quality 111:Pneumonia Vaccination Status For Older Adults: Pneumococcal Vaccination Previously Received
Quality 130: Documentation Of Current Medications In The Medical Record: Current Medications Documented
Quality 410: Psoriasis Clinical Response To Oral Systemic Or Biologic Medications: Patient has been on biologic or system therapy for less than 6 months
Detail Level: Detailed

## 2020-02-19 NOTE — PROCEDURE: TREATMENT REGIMEN
Plan: Patient is currently under Dr. Pham’s care.\\nPatient is taking Xejanz, Cellcept, and Prednisone for this diagnosis.\\nWill continue to communicate with Dr. Pham with updates.
Detail Level: Zone

## 2021-03-03 DIAGNOSIS — Z23 NEED FOR VACCINATION: ICD-10-CM
